# Patient Record
Sex: FEMALE | Race: ASIAN | Employment: PART TIME | URBAN - METROPOLITAN AREA
[De-identification: names, ages, dates, MRNs, and addresses within clinical notes are randomized per-mention and may not be internally consistent; named-entity substitution may affect disease eponyms.]

---

## 2017-09-19 ENCOUNTER — HOSPITAL ENCOUNTER (OUTPATIENT)
Dept: MRI IMAGING | Age: 42
Discharge: HOME | End: 2017-09-19
Attending: OPHTHALMOLOGY
Payer: COMMERCIAL

## 2017-09-19 VITALS — WEIGHT: 150 LBS

## 2017-09-19 DIAGNOSIS — H47.092 OTHER DISORDERS OF OPTIC NERVE, NOT ELSEWHERE CLASSIFIED, LEFT EYE: ICD-10-CM

## 2017-09-19 PROCEDURE — 74011250636 HC RX REV CODE- 250/636: Performed by: RADIOLOGY

## 2017-09-19 PROCEDURE — A9576 INJ PROHANCE MULTIPACK: HCPCS | Performed by: RADIOLOGY

## 2017-09-19 PROCEDURE — 70553 MRI BRAIN STEM W/O & W/DYE: CPT

## 2017-09-19 RX ADMIN — GADOTERIDOL 13 ML: 279.3 INJECTION, SOLUTION INTRAVENOUS at 13:00

## 2017-10-04 ENCOUNTER — HOSPITAL ENCOUNTER (OUTPATIENT)
Dept: PREADMISSION TESTING | Age: 42
Discharge: HOME | DRG: 025 | End: 2017-10-04
Payer: COMMERCIAL

## 2017-10-04 VITALS
RESPIRATION RATE: 20 BRPM | HEIGHT: 62 IN | BODY MASS INDEX: 29.63 KG/M2 | HEART RATE: 60 BPM | DIASTOLIC BLOOD PRESSURE: 66 MMHG | SYSTOLIC BLOOD PRESSURE: 102 MMHG | TEMPERATURE: 98.3 F | WEIGHT: 161 LBS

## 2017-10-04 LAB
ANION GAP SERPL CALC-SCNC: 10 MMOL/L (ref 5–15)
APTT PPP: 24 SEC (ref 22.1–32.5)
BASOPHILS # BLD: 0 K/UL (ref 0–0.1)
BASOPHILS NFR BLD: 0 % (ref 0–1)
BUN SERPL-MCNC: 11 MG/DL (ref 6–20)
BUN/CREAT SERPL: 14 (ref 12–20)
CALCIUM SERPL-MCNC: 8.5 MG/DL (ref 8.5–10.1)
CHLORIDE SERPL-SCNC: 104 MMOL/L (ref 97–108)
CO2 SERPL-SCNC: 26 MMOL/L (ref 21–32)
CREAT SERPL-MCNC: 0.76 MG/DL (ref 0.55–1.02)
EOSINOPHIL # BLD: 0 K/UL (ref 0–0.4)
EOSINOPHIL NFR BLD: 0 % (ref 0–7)
ERYTHROCYTE [DISTWIDTH] IN BLOOD BY AUTOMATED COUNT: 14.7 % (ref 11.5–14.5)
GLUCOSE SERPL-MCNC: 130 MG/DL (ref 65–100)
HCT VFR BLD AUTO: 39.7 % (ref 35–47)
HGB BLD-MCNC: 12.6 G/DL (ref 11.5–16)
INR PPP: 1 (ref 0.9–1.1)
LYMPHOCYTES # BLD: 1.4 K/UL (ref 0.8–3.5)
LYMPHOCYTES NFR BLD: 11 % (ref 12–49)
MCH RBC QN AUTO: 27.9 PG (ref 26–34)
MCHC RBC AUTO-ENTMCNC: 31.7 G/DL (ref 30–36.5)
MCV RBC AUTO: 87.8 FL (ref 80–99)
MONOCYTES # BLD: 0.4 K/UL (ref 0–1)
MONOCYTES NFR BLD: 3 % (ref 5–13)
NEUTS SEG # BLD: 11.1 K/UL (ref 1.8–8)
NEUTS SEG NFR BLD: 86 % (ref 32–75)
PLATELET # BLD AUTO: 216 K/UL (ref 150–400)
POTASSIUM SERPL-SCNC: 4.7 MMOL/L (ref 3.5–5.1)
PROTHROMBIN TIME: 9.9 SEC (ref 9–11.1)
RBC # BLD AUTO: 4.52 M/UL (ref 3.8–5.2)
SODIUM SERPL-SCNC: 140 MMOL/L (ref 136–145)
THERAPEUTIC RANGE,PTTT: NORMAL SECS (ref 58–77)
WBC # BLD AUTO: 12.9 K/UL (ref 3.6–11)

## 2017-10-04 RX ORDER — ACETAMINOPHEN 325 MG/1
650 TABLET ORAL
COMMUNITY

## 2017-10-04 RX ORDER — LEVOTHYROXINE SODIUM 100 UG/1
100 TABLET ORAL
COMMUNITY

## 2017-10-04 RX ORDER — ERGOCALCIFEROL 1.25 MG/1
50000 CAPSULE ORAL
COMMUNITY

## 2017-10-04 RX ORDER — BISMUTH SUBSALICYLATE 262 MG
1 TABLET,CHEWABLE ORAL DAILY
COMMUNITY

## 2017-10-04 RX ORDER — DEXAMETHASONE 4 MG/1
4 TABLET ORAL DAILY
Status: ON HOLD | COMMUNITY
End: 2017-10-13

## 2017-10-05 ENCOUNTER — HOSPITAL ENCOUNTER (OUTPATIENT)
Dept: CT IMAGING | Age: 42
Discharge: HOME | DRG: 025 | End: 2017-10-05
Attending: NEUROLOGICAL SURGERY
Payer: COMMERCIAL

## 2017-10-05 VITALS
RESPIRATION RATE: 20 BRPM | SYSTOLIC BLOOD PRESSURE: 118 MMHG | DIASTOLIC BLOOD PRESSURE: 79 MMHG | OXYGEN SATURATION: 100 % | HEART RATE: 71 BPM

## 2017-10-05 DIAGNOSIS — D32.9 BENIGN NEOPLASM OF MENINGES (HCC): ICD-10-CM

## 2017-10-05 RX ORDER — SODIUM CHLORIDE 0.9 % (FLUSH) 0.9 %
10 SYRINGE (ML) INJECTION
Status: COMPLETED | OUTPATIENT
Start: 2017-10-05 | End: 2017-10-05

## 2017-10-05 RX ADMIN — IOPAMIDOL 100 ML: 612 INJECTION, SOLUTION INTRAVENOUS at 10:51

## 2017-10-05 RX ADMIN — SODIUM CHLORIDE 100 ML: 900 INJECTION, SOLUTION INTRAVENOUS at 10:51

## 2017-10-05 RX ADMIN — Medication 10 ML: at 10:51

## 2017-10-05 NOTE — PROGRESS NOTES
Called to see pt in CT ER s/p CT of head with IV contrast.  Pt c/o itching in throat , has no wheezing or SOB.  VSS. See connectcare flowsheet. IV still in. Pt has one small raised area in right upper chest.      Notified Dr. Debby Yanez of above. Received no further orders except to monitor pt for 15 more minutes. Encouraged pt to drink water and monitored pt for 40 min post CT scan. Pt drank 1200 cc of water. Pt stable at discharge. VSS. See flowsheet. Right upper chest raised area dissipated. Itchiness in throat dissipated to slight feeling. Pt and  verbalized understanding of instructions. Pt discharged to home with belongings and instructions via wheelchair with .

## 2017-10-06 ENCOUNTER — ANESTHESIA EVENT (OUTPATIENT)
Dept: SURGERY | Age: 42
DRG: 025 | End: 2017-10-06
Payer: COMMERCIAL

## 2017-10-09 ENCOUNTER — ANESTHESIA (OUTPATIENT)
Dept: SURGERY | Age: 42
DRG: 025 | End: 2017-10-09
Payer: COMMERCIAL

## 2017-10-09 ENCOUNTER — HOSPITAL ENCOUNTER (INPATIENT)
Age: 42
LOS: 4 days | Discharge: HOME | DRG: 025 | End: 2017-10-13
Attending: NEUROLOGICAL SURGERY | Admitting: NEUROLOGICAL SURGERY
Payer: COMMERCIAL

## 2017-10-09 PROBLEM — D32.9 MENINGIOMA (HCC): Status: ACTIVE | Noted: 2017-10-09

## 2017-10-09 LAB
ARTERIAL PATENCY WRIST A: ABNORMAL
ARTERIAL PATENCY WRIST A: ABNORMAL
BASE DEFICIT BLD-SCNC: 1 MMOL/L
BASE EXCESS BLD CALC-SCNC: 0 MMOL/L
BDY SITE: ABNORMAL
BDY SITE: ABNORMAL
GAS FLOW.O2 O2 DELIVERY SYS: ABNORMAL L/MIN
GAS FLOW.O2 O2 DELIVERY SYS: ABNORMAL L/MIN
GAS FLOW.O2 SETTING OXYMISER: 10 BPM
GAS FLOW.O2 SETTING OXYMISER: 12 BPM
GLUCOSE BLD STRIP.AUTO-MCNC: 82 MG/DL (ref 65–100)
HCG UR QL: NEGATIVE
HCO3 BLD-SCNC: 24 MMOL/L (ref 22–26)
HCO3 BLD-SCNC: 24 MMOL/L (ref 22–26)
O2/TOTAL GAS SETTING VFR VENT: 0.5 %
O2/TOTAL GAS SETTING VFR VENT: 50 %
PCO2 BLD: 33.8 MMHG (ref 35–45)
PCO2 BLD: 38.4 MMHG (ref 35–45)
PEEP RESPIRATORY: 5 CMH2O
PEEP RESPIRATORY: 5 CMH2O
PH BLD: 7.4 [PH] (ref 7.35–7.45)
PH BLD: 7.46 [PH] (ref 7.35–7.45)
PO2 BLD: 209 MMHG (ref 80–100)
PO2 BLD: 222 MMHG (ref 80–100)
SAO2 % BLD: 100 % (ref 92–97)
SAO2 % BLD: 100 % (ref 92–97)
SERVICE CMNT-IMP: NORMAL
SPECIMEN TYPE: ABNORMAL
SPECIMEN TYPE: ABNORMAL
VENTILATION MODE VENT: ABNORMAL
VENTILATION MODE VENT: ABNORMAL
VOLUME CONTROL IVLC: YES
VT SETTING VENT: 470 ML
VT SETTING VENT: 525 ML

## 2017-10-09 PROCEDURE — 77030010813: Performed by: NEUROLOGICAL SURGERY

## 2017-10-09 PROCEDURE — 74011250636 HC RX REV CODE- 250/636: Performed by: NEUROLOGICAL SURGERY

## 2017-10-09 PROCEDURE — 74011000250 HC RX REV CODE- 250

## 2017-10-09 PROCEDURE — 74011000258 HC RX REV CODE- 258

## 2017-10-09 PROCEDURE — 77030010938 HC CLP LIG TELE -A: Performed by: NEUROLOGICAL SURGERY

## 2017-10-09 PROCEDURE — 77030018836 HC SOL IRR NACL ICUM -A: Performed by: NEUROLOGICAL SURGERY

## 2017-10-09 PROCEDURE — 77030013797 HC KT TRNSDUC PRSSR EDWD -A

## 2017-10-09 PROCEDURE — 77030003892 HC BIT DRL TWST MEDT -B: Performed by: NEUROLOGICAL SURGERY

## 2017-10-09 PROCEDURE — 74011250636 HC RX REV CODE- 250/636: Performed by: ANESTHESIOLOGY

## 2017-10-09 PROCEDURE — C1713 ANCHOR/SCREW BN/BN,TIS/BN: HCPCS | Performed by: NEUROLOGICAL SURGERY

## 2017-10-09 PROCEDURE — 77030032490 HC SLV COMPR SCD KNE COVD -B: Performed by: NEUROLOGICAL SURGERY

## 2017-10-09 PROCEDURE — 77030004391 HC BUR FLUT MEDT -C: Performed by: NEUROLOGICAL SURGERY

## 2017-10-09 PROCEDURE — 74011250636 HC RX REV CODE- 250/636

## 2017-10-09 PROCEDURE — 76060000045 HC ANESTHESIA 7 TO 7.5 HR: Performed by: NEUROLOGICAL SURGERY

## 2017-10-09 PROCEDURE — 82962 GLUCOSE BLOOD TEST: CPT

## 2017-10-09 PROCEDURE — 77030031139 HC SUT VCRL2 J&J -A: Performed by: NEUROLOGICAL SURGERY

## 2017-10-09 PROCEDURE — 88331 PATH CONSLTJ SURG 1 BLK 1SPC: CPT | Performed by: NEUROLOGICAL SURGERY

## 2017-10-09 PROCEDURE — 77030002946 HC SUT NRLN J&J -B: Performed by: NEUROLOGICAL SURGERY

## 2017-10-09 PROCEDURE — 77030020061 HC IV BLD WRMR ADMIN SET 3M -B: Performed by: ANESTHESIOLOGY

## 2017-10-09 PROCEDURE — 74011000250 HC RX REV CODE- 250: Performed by: NEUROLOGICAL SURGERY

## 2017-10-09 PROCEDURE — 77030013137 HC MRK XR CRAN BUSA -A: Performed by: NEUROLOGICAL SURGERY

## 2017-10-09 PROCEDURE — 77030014647 HC SEAL FBRN TISSL BAXT -D: Performed by: NEUROLOGICAL SURGERY

## 2017-10-09 PROCEDURE — 82803 BLOOD GASES ANY COMBINATION: CPT

## 2017-10-09 PROCEDURE — 77030005402 HC CATH RAD ART LN KT TELE -B

## 2017-10-09 PROCEDURE — 77030003029 HC SUT VCRL J&J -B: Performed by: NEUROLOGICAL SURGERY

## 2017-10-09 PROCEDURE — 76210000006 HC OR PH I REC 0.5 TO 1 HR: Performed by: NEUROLOGICAL SURGERY

## 2017-10-09 PROCEDURE — 77030036859 HC DRN CSF EXT VOL LIMIT SYS INLC -D: Performed by: NEUROLOGICAL SURGERY

## 2017-10-09 PROCEDURE — 77030020782 HC GWN BAIR PAWS FLX 3M -B

## 2017-10-09 PROCEDURE — 76010000181 HC OR TIME 7 TO 7.5 HR INTENSV-TIER 1: Performed by: NEUROLOGICAL SURGERY

## 2017-10-09 PROCEDURE — 77030014355 HC CVR BUR H TI BIOM -C: Performed by: NEUROLOGICAL SURGERY

## 2017-10-09 PROCEDURE — 77030004826 HC CATH CSF INLC -C: Performed by: NEUROLOGICAL SURGERY

## 2017-10-09 PROCEDURE — 77030011640 HC PAD GRND REM COVD -A: Performed by: NEUROLOGICAL SURGERY

## 2017-10-09 PROCEDURE — 77030014007 HC SPNG HEMSTAT J&J -B: Performed by: NEUROLOGICAL SURGERY

## 2017-10-09 PROCEDURE — 77030037673 HC TBNG VISTA V-LYTE PMP STRY -B: Performed by: NEUROLOGICAL SURGERY

## 2017-10-09 PROCEDURE — 85018 HEMOGLOBIN: CPT

## 2017-10-09 PROCEDURE — 77030008684 HC TU ET CUF COVD -B: Performed by: ANESTHESIOLOGY

## 2017-10-09 PROCEDURE — 77030020263 HC SOL INJ SOD CL0.9% LFCR 1000ML: Performed by: NEUROLOGICAL SURGERY

## 2017-10-09 PROCEDURE — 88307 TISSUE EXAM BY PATHOLOGIST: CPT | Performed by: NEUROLOGICAL SURGERY

## 2017-10-09 PROCEDURE — 81025 URINE PREGNANCY TEST: CPT

## 2017-10-09 PROCEDURE — 77030034349 HC TIP SPTZLR BARACUDA DISP STRY -G: Performed by: NEUROLOGICAL SURGERY

## 2017-10-09 PROCEDURE — 00B00ZZ EXCISION OF BRAIN, OPEN APPROACH: ICD-10-PCS | Performed by: NEUROLOGICAL SURGERY

## 2017-10-09 PROCEDURE — 65610000006 HC RM INTENSIVE CARE

## 2017-10-09 PROCEDURE — 74011000272 HC RX REV CODE- 272: Performed by: NEUROLOGICAL SURGERY

## 2017-10-09 PROCEDURE — 74011000258 HC RX REV CODE- 258: Performed by: NEUROLOGICAL SURGERY

## 2017-10-09 PROCEDURE — 77030013079 HC BLNKT BAIR HGGR 3M -A: Performed by: ANESTHESIOLOGY

## 2017-10-09 PROCEDURE — 77030004472 HC BUR TAPR MEDT -B: Performed by: NEUROLOGICAL SURGERY

## 2017-10-09 PROCEDURE — 77030009081 HC CLP NEUR GUN SET MEDT -B: Performed by: NEUROLOGICAL SURGERY

## 2017-10-09 PROCEDURE — 77030018390 HC SPNG HEMSTAT2 J&J -B: Performed by: NEUROLOGICAL SURGERY

## 2017-10-09 PROCEDURE — 77030012602 HC SPNG PTTY NEUR J&J -B: Performed by: NEUROLOGICAL SURGERY

## 2017-10-09 PROCEDURE — 77030026438 HC STYL ET INTUB CARD -A: Performed by: ANESTHESIOLOGY

## 2017-10-09 PROCEDURE — 77030002996 HC SUT SLK J&J -A: Performed by: NEUROLOGICAL SURGERY

## 2017-10-09 PROCEDURE — 77030012890

## 2017-10-09 DEVICE — PLATE BONE SZ 1.5MM REG 6 H SLV DBL Y SHP TRAUMAONE LORENZ: Type: IMPLANTABLE DEVICE | Site: SKULL | Status: FUNCTIONAL

## 2017-10-09 DEVICE — COVER BUR H L DIA18.5MM THK0.5MM 5 H NEURO TI W/O TAB: Type: IMPLANTABLE DEVICE | Site: SKULL | Status: FUNCTIONAL

## 2017-10-09 DEVICE — SCREW BNE L3.5MM DIA1.5MM CORT MAXILLOMANDIBULAR GRN TI: Type: IMPLANTABLE DEVICE | Site: SKULL | Status: FUNCTIONAL

## 2017-10-09 DEVICE — 15CC HYDROSET INJECTABLE CEMENT
Type: IMPLANTABLE DEVICE | Site: CRANIAL | Status: FUNCTIONAL
Brand: HYDROSET

## 2017-10-09 DEVICE — SCREW BONE CRANIO MAXILLOFACIAL HI TORQ CROSS DRV ST MIDFACE: Type: IMPLANTABLE DEVICE | Site: SKULL | Status: FUNCTIONAL

## 2017-10-09 RX ORDER — SODIUM CHLORIDE, SODIUM LACTATE, POTASSIUM CHLORIDE, CALCIUM CHLORIDE 600; 310; 30; 20 MG/100ML; MG/100ML; MG/100ML; MG/100ML
1000 INJECTION, SOLUTION INTRAVENOUS CONTINUOUS
Status: DISCONTINUED | OUTPATIENT
Start: 2017-10-09 | End: 2017-10-09 | Stop reason: HOSPADM

## 2017-10-09 RX ORDER — FENTANYL CITRATE 50 UG/ML
50 INJECTION, SOLUTION INTRAMUSCULAR; INTRAVENOUS AS NEEDED
Status: DISCONTINUED | OUTPATIENT
Start: 2017-10-09 | End: 2017-10-09 | Stop reason: HOSPADM

## 2017-10-09 RX ORDER — CEFAZOLIN SODIUM IN 0.9 % NACL 2 G/50 ML
2 INTRAVENOUS SOLUTION, PIGGYBACK (ML) INTRAVENOUS EVERY 8 HOURS
Status: COMPLETED | OUTPATIENT
Start: 2017-10-09 | End: 2017-10-10

## 2017-10-09 RX ORDER — MIDAZOLAM HYDROCHLORIDE 1 MG/ML
INJECTION, SOLUTION INTRAMUSCULAR; INTRAVENOUS AS NEEDED
Status: DISCONTINUED | OUTPATIENT
Start: 2017-10-09 | End: 2017-10-09 | Stop reason: HOSPADM

## 2017-10-09 RX ORDER — ALBUTEROL SULFATE 0.83 MG/ML
2.5 SOLUTION RESPIRATORY (INHALATION) AS NEEDED
Status: DISCONTINUED | OUTPATIENT
Start: 2017-10-09 | End: 2017-10-09 | Stop reason: HOSPADM

## 2017-10-09 RX ORDER — LABETALOL HYDROCHLORIDE 5 MG/ML
10 INJECTION, SOLUTION INTRAVENOUS AS NEEDED
Status: DISCONTINUED | OUTPATIENT
Start: 2017-10-09 | End: 2017-10-13 | Stop reason: HOSPADM

## 2017-10-09 RX ORDER — FENTANYL CITRATE 50 UG/ML
25 INJECTION, SOLUTION INTRAMUSCULAR; INTRAVENOUS
Status: DISCONTINUED | OUTPATIENT
Start: 2017-10-09 | End: 2017-10-09 | Stop reason: HOSPADM

## 2017-10-09 RX ORDER — ONDANSETRON 2 MG/ML
4 INJECTION INTRAMUSCULAR; INTRAVENOUS AS NEEDED
Status: DISCONTINUED | OUTPATIENT
Start: 2017-10-09 | End: 2017-10-09 | Stop reason: HOSPADM

## 2017-10-09 RX ORDER — MIDAZOLAM HYDROCHLORIDE 1 MG/ML
1 INJECTION, SOLUTION INTRAMUSCULAR; INTRAVENOUS AS NEEDED
Status: DISCONTINUED | OUTPATIENT
Start: 2017-10-09 | End: 2017-10-09 | Stop reason: HOSPADM

## 2017-10-09 RX ORDER — SODIUM CHLORIDE 9 MG/ML
25 INJECTION, SOLUTION INTRAVENOUS CONTINUOUS
Status: DISCONTINUED | OUTPATIENT
Start: 2017-10-09 | End: 2017-10-09 | Stop reason: HOSPADM

## 2017-10-09 RX ORDER — SUCCINYLCHOLINE CHLORIDE 20 MG/ML
INJECTION INTRAMUSCULAR; INTRAVENOUS AS NEEDED
Status: DISCONTINUED | OUTPATIENT
Start: 2017-10-09 | End: 2017-10-09 | Stop reason: HOSPADM

## 2017-10-09 RX ORDER — LEVETIRACETAM 10 MG/ML
INJECTION INTRAVASCULAR AS NEEDED
Status: DISCONTINUED | OUTPATIENT
Start: 2017-10-09 | End: 2017-10-09 | Stop reason: HOSPADM

## 2017-10-09 RX ORDER — HYDROMORPHONE HYDROCHLORIDE 1 MG/ML
INJECTION, SOLUTION INTRAMUSCULAR; INTRAVENOUS; SUBCUTANEOUS AS NEEDED
Status: DISCONTINUED | OUTPATIENT
Start: 2017-10-09 | End: 2017-10-09 | Stop reason: HOSPADM

## 2017-10-09 RX ORDER — PROPOFOL 10 MG/ML
INJECTION, EMULSION INTRAVENOUS
Status: DISCONTINUED | OUTPATIENT
Start: 2017-10-09 | End: 2017-10-09 | Stop reason: HOSPADM

## 2017-10-09 RX ORDER — ONDANSETRON 2 MG/ML
4 INJECTION INTRAMUSCULAR; INTRAVENOUS
Status: DISCONTINUED | OUTPATIENT
Start: 2017-10-09 | End: 2017-10-13 | Stop reason: HOSPADM

## 2017-10-09 RX ORDER — SODIUM CHLORIDE 0.9 % (FLUSH) 0.9 %
5-10 SYRINGE (ML) INJECTION EVERY 8 HOURS
Status: DISCONTINUED | OUTPATIENT
Start: 2017-10-09 | End: 2017-10-13 | Stop reason: HOSPADM

## 2017-10-09 RX ORDER — LEVOTHYROXINE SODIUM 100 UG/1
100 TABLET ORAL
Status: DISCONTINUED | OUTPATIENT
Start: 2017-10-10 | End: 2017-10-13 | Stop reason: HOSPADM

## 2017-10-09 RX ORDER — DEXAMETHASONE SODIUM PHOSPHATE 4 MG/ML
6 INJECTION, SOLUTION INTRA-ARTICULAR; INTRALESIONAL; INTRAMUSCULAR; INTRAVENOUS; SOFT TISSUE EVERY 6 HOURS
Status: DISCONTINUED | OUTPATIENT
Start: 2017-10-09 | End: 2017-10-10

## 2017-10-09 RX ORDER — OXYCODONE AND ACETAMINOPHEN 5; 325 MG/1; MG/1
1-2 TABLET ORAL
Status: DISCONTINUED | OUTPATIENT
Start: 2017-10-09 | End: 2017-10-13 | Stop reason: HOSPADM

## 2017-10-09 RX ORDER — MIDAZOLAM HYDROCHLORIDE 1 MG/ML
0.5 INJECTION, SOLUTION INTRAMUSCULAR; INTRAVENOUS
Status: DISCONTINUED | OUTPATIENT
Start: 2017-10-09 | End: 2017-10-09 | Stop reason: HOSPADM

## 2017-10-09 RX ORDER — ACETAMINOPHEN 325 MG/1
650 TABLET ORAL
Status: DISCONTINUED | OUTPATIENT
Start: 2017-10-09 | End: 2017-10-13 | Stop reason: HOSPADM

## 2017-10-09 RX ORDER — CEFAZOLIN SODIUM IN 0.9 % NACL 2 G/50 ML
2 INTRAVENOUS SOLUTION, PIGGYBACK (ML) INTRAVENOUS ONCE
Status: COMPLETED | OUTPATIENT
Start: 2017-10-09 | End: 2017-10-09

## 2017-10-09 RX ORDER — GLYCOPYRROLATE 0.2 MG/ML
INJECTION INTRAMUSCULAR; INTRAVENOUS AS NEEDED
Status: DISCONTINUED | OUTPATIENT
Start: 2017-10-09 | End: 2017-10-09 | Stop reason: HOSPADM

## 2017-10-09 RX ORDER — ROPIVACAINE HYDROCHLORIDE 5 MG/ML
30 INJECTION, SOLUTION EPIDURAL; INFILTRATION; PERINEURAL AS NEEDED
Status: DISCONTINUED | OUTPATIENT
Start: 2017-10-09 | End: 2017-10-09 | Stop reason: HOSPADM

## 2017-10-09 RX ORDER — SODIUM CHLORIDE AND POTASSIUM CHLORIDE .9; .15 G/100ML; G/100ML
SOLUTION INTRAVENOUS CONTINUOUS
Status: DISCONTINUED | OUTPATIENT
Start: 2017-10-09 | End: 2017-10-11

## 2017-10-09 RX ORDER — POVIDONE-IODINE 10 MG/G
OINTMENT TOPICAL AS NEEDED
Status: DISCONTINUED | OUTPATIENT
Start: 2017-10-09 | End: 2017-10-09 | Stop reason: HOSPADM

## 2017-10-09 RX ORDER — DIPHENHYDRAMINE HYDROCHLORIDE 50 MG/ML
12.5 INJECTION, SOLUTION INTRAMUSCULAR; INTRAVENOUS AS NEEDED
Status: DISCONTINUED | OUTPATIENT
Start: 2017-10-09 | End: 2017-10-09 | Stop reason: HOSPADM

## 2017-10-09 RX ORDER — HYDROMORPHONE HYDROCHLORIDE 1 MG/ML
1 INJECTION, SOLUTION INTRAMUSCULAR; INTRAVENOUS; SUBCUTANEOUS
Status: DISCONTINUED | OUTPATIENT
Start: 2017-10-09 | End: 2017-10-13 | Stop reason: HOSPADM

## 2017-10-09 RX ORDER — HYDROMORPHONE HYDROCHLORIDE 1 MG/ML
0.2 INJECTION, SOLUTION INTRAMUSCULAR; INTRAVENOUS; SUBCUTANEOUS
Status: DISCONTINUED | OUTPATIENT
Start: 2017-10-09 | End: 2017-10-09 | Stop reason: HOSPADM

## 2017-10-09 RX ORDER — NEOSTIGMINE METHYLSULFATE 1 MG/ML
INJECTION INTRAVENOUS AS NEEDED
Status: DISCONTINUED | OUTPATIENT
Start: 2017-10-09 | End: 2017-10-09 | Stop reason: HOSPADM

## 2017-10-09 RX ORDER — ROCURONIUM BROMIDE 10 MG/ML
INJECTION, SOLUTION INTRAVENOUS AS NEEDED
Status: DISCONTINUED | OUTPATIENT
Start: 2017-10-09 | End: 2017-10-09 | Stop reason: HOSPADM

## 2017-10-09 RX ORDER — SODIUM CHLORIDE 0.9 % (FLUSH) 0.9 %
5-10 SYRINGE (ML) INJECTION AS NEEDED
Status: DISCONTINUED | OUTPATIENT
Start: 2017-10-09 | End: 2017-10-13 | Stop reason: HOSPADM

## 2017-10-09 RX ORDER — FENTANYL CITRATE 50 UG/ML
INJECTION, SOLUTION INTRAMUSCULAR; INTRAVENOUS AS NEEDED
Status: DISCONTINUED | OUTPATIENT
Start: 2017-10-09 | End: 2017-10-09 | Stop reason: HOSPADM

## 2017-10-09 RX ORDER — LIDOCAINE HYDROCHLORIDE 20 MG/ML
INJECTION, SOLUTION EPIDURAL; INFILTRATION; INTRACAUDAL; PERINEURAL AS NEEDED
Status: DISCONTINUED | OUTPATIENT
Start: 2017-10-09 | End: 2017-10-09 | Stop reason: HOSPADM

## 2017-10-09 RX ORDER — GLYCOPYRROLATE 0.2 MG/ML
0.2 INJECTION INTRAMUSCULAR; INTRAVENOUS
Status: DISCONTINUED | OUTPATIENT
Start: 2017-10-09 | End: 2017-10-09 | Stop reason: HOSPADM

## 2017-10-09 RX ORDER — ONDANSETRON 2 MG/ML
INJECTION INTRAMUSCULAR; INTRAVENOUS AS NEEDED
Status: DISCONTINUED | OUTPATIENT
Start: 2017-10-09 | End: 2017-10-09 | Stop reason: HOSPADM

## 2017-10-09 RX ORDER — MANNITOL 20 G/100ML
INJECTION, SOLUTION INTRAVENOUS
Status: DISCONTINUED | OUTPATIENT
Start: 2017-10-09 | End: 2017-10-09 | Stop reason: HOSPADM

## 2017-10-09 RX ORDER — HYDROCODONE BITARTRATE AND ACETAMINOPHEN 5; 325 MG/1; MG/1
1 TABLET ORAL AS NEEDED
Status: DISCONTINUED | OUTPATIENT
Start: 2017-10-09 | End: 2017-10-09 | Stop reason: HOSPADM

## 2017-10-09 RX ORDER — DEXAMETHASONE SODIUM PHOSPHATE 4 MG/ML
INJECTION, SOLUTION INTRA-ARTICULAR; INTRALESIONAL; INTRAMUSCULAR; INTRAVENOUS; SOFT TISSUE AS NEEDED
Status: DISCONTINUED | OUTPATIENT
Start: 2017-10-09 | End: 2017-10-09 | Stop reason: HOSPADM

## 2017-10-09 RX ORDER — SODIUM CHLORIDE 9 MG/ML
INJECTION, SOLUTION INTRAVENOUS
Status: DISCONTINUED | OUTPATIENT
Start: 2017-10-09 | End: 2017-10-09 | Stop reason: HOSPADM

## 2017-10-09 RX ORDER — PROPOFOL 10 MG/ML
INJECTION, EMULSION INTRAVENOUS AS NEEDED
Status: DISCONTINUED | OUTPATIENT
Start: 2017-10-09 | End: 2017-10-09 | Stop reason: HOSPADM

## 2017-10-09 RX ORDER — SODIUM CHLORIDE 9 MG/ML
250 INJECTION, SOLUTION INTRAVENOUS AS NEEDED
Status: DISCONTINUED | OUTPATIENT
Start: 2017-10-09 | End: 2017-10-09

## 2017-10-09 RX ORDER — LIDOCAINE HYDROCHLORIDE 10 MG/ML
0.1 INJECTION, SOLUTION EPIDURAL; INFILTRATION; INTRACAUDAL; PERINEURAL AS NEEDED
Status: DISCONTINUED | OUTPATIENT
Start: 2017-10-09 | End: 2017-10-09 | Stop reason: HOSPADM

## 2017-10-09 RX ORDER — MORPHINE SULFATE 10 MG/ML
2 INJECTION, SOLUTION INTRAMUSCULAR; INTRAVENOUS
Status: DISCONTINUED | OUTPATIENT
Start: 2017-10-09 | End: 2017-10-09 | Stop reason: HOSPADM

## 2017-10-09 RX ADMIN — PROPOFOL 150 MG: 10 INJECTION, EMULSION INTRAVENOUS at 09:57

## 2017-10-09 RX ADMIN — ROCURONIUM BROMIDE 5 MG: 10 INJECTION, SOLUTION INTRAVENOUS at 09:57

## 2017-10-09 RX ADMIN — ROCURONIUM BROMIDE 10 MG: 10 INJECTION, SOLUTION INTRAVENOUS at 11:47

## 2017-10-09 RX ADMIN — LEVETIRACETAM 500 MG: 100 INJECTION, SOLUTION, CONCENTRATE INTRAVENOUS at 21:28

## 2017-10-09 RX ADMIN — SODIUM CHLORIDE, SODIUM LACTATE, POTASSIUM CHLORIDE, AND CALCIUM CHLORIDE 1000 ML: 600; 310; 30; 20 INJECTION, SOLUTION INTRAVENOUS at 09:01

## 2017-10-09 RX ADMIN — HYDROMORPHONE HYDROCHLORIDE 0.2 MG: 1 INJECTION, SOLUTION INTRAMUSCULAR; INTRAVENOUS; SUBCUTANEOUS at 16:30

## 2017-10-09 RX ADMIN — FENTANYL CITRATE 25 MCG: 50 INJECTION, SOLUTION INTRAMUSCULAR; INTRAVENOUS at 15:22

## 2017-10-09 RX ADMIN — ROCURONIUM BROMIDE 10 MG: 10 INJECTION, SOLUTION INTRAVENOUS at 14:38

## 2017-10-09 RX ADMIN — LIDOCAINE HYDROCHLORIDE 80 MG: 20 INJECTION, SOLUTION EPIDURAL; INFILTRATION; INTRACAUDAL; PERINEURAL at 09:57

## 2017-10-09 RX ADMIN — FENTANYL CITRATE 50 MCG: 50 INJECTION, SOLUTION INTRAMUSCULAR; INTRAVENOUS at 10:27

## 2017-10-09 RX ADMIN — ROCURONIUM BROMIDE 10 MG: 10 INJECTION, SOLUTION INTRAVENOUS at 13:15

## 2017-10-09 RX ADMIN — ROCURONIUM BROMIDE 10 MG: 10 INJECTION, SOLUTION INTRAVENOUS at 11:29

## 2017-10-09 RX ADMIN — POTASSIUM CHLORIDE AND SODIUM CHLORIDE: 900; 150 INJECTION, SOLUTION INTRAVENOUS at 18:09

## 2017-10-09 RX ADMIN — CEFAZOLIN 2 G: 1 INJECTION, POWDER, FOR SOLUTION INTRAMUSCULAR; INTRAVENOUS; PARENTERAL at 14:11

## 2017-10-09 RX ADMIN — SODIUM CHLORIDE, SODIUM LACTATE, POTASSIUM CHLORIDE, AND CALCIUM CHLORIDE 1000 ML: 600; 310; 30; 20 INJECTION, SOLUTION INTRAVENOUS at 08:34

## 2017-10-09 RX ADMIN — HYDROMORPHONE HYDROCHLORIDE 0.2 MG: 1 INJECTION, SOLUTION INTRAMUSCULAR; INTRAVENOUS; SUBCUTANEOUS at 16:25

## 2017-10-09 RX ADMIN — ROCURONIUM BROMIDE 10 MG: 10 INJECTION, SOLUTION INTRAVENOUS at 15:16

## 2017-10-09 RX ADMIN — ROCURONIUM BROMIDE 10 MG: 10 INJECTION, SOLUTION INTRAVENOUS at 10:44

## 2017-10-09 RX ADMIN — CEFAZOLIN 2 G: 1 INJECTION, POWDER, FOR SOLUTION INTRAMUSCULAR; INTRAVENOUS; PARENTERAL at 10:35

## 2017-10-09 RX ADMIN — ROCURONIUM BROMIDE 10 MG: 10 INJECTION, SOLUTION INTRAVENOUS at 15:53

## 2017-10-09 RX ADMIN — Medication 10 ML: at 21:30

## 2017-10-09 RX ADMIN — ROCURONIUM BROMIDE 10 MG: 10 INJECTION, SOLUTION INTRAVENOUS at 12:49

## 2017-10-09 RX ADMIN — PROPOFOL 75 MCG/KG/MIN: 10 INJECTION, EMULSION INTRAVENOUS at 10:01

## 2017-10-09 RX ADMIN — MANNITOL: 20 INJECTION, SOLUTION INTRAVENOUS at 10:04

## 2017-10-09 RX ADMIN — FENTANYL CITRATE 50 MCG: 50 INJECTION, SOLUTION INTRAMUSCULAR; INTRAVENOUS at 10:11

## 2017-10-09 RX ADMIN — FENTANYL CITRATE 50 MCG: 50 INJECTION, SOLUTION INTRAMUSCULAR; INTRAVENOUS at 09:57

## 2017-10-09 RX ADMIN — MIDAZOLAM HYDROCHLORIDE 2 MG: 1 INJECTION, SOLUTION INTRAMUSCULAR; INTRAVENOUS at 09:49

## 2017-10-09 RX ADMIN — DEXAMETHASONE SODIUM PHOSPHATE 10 MG: 4 INJECTION, SOLUTION INTRA-ARTICULAR; INTRALESIONAL; INTRAMUSCULAR; INTRAVENOUS; SOFT TISSUE at 10:04

## 2017-10-09 RX ADMIN — LEVETIRACETAM 1000 MG: 10 INJECTION INTRAVASCULAR at 10:05

## 2017-10-09 RX ADMIN — FENTANYL CITRATE 50 MCG: 50 INJECTION, SOLUTION INTRAMUSCULAR; INTRAVENOUS at 11:16

## 2017-10-09 RX ADMIN — DEXAMETHASONE SODIUM PHOSPHATE 10 MG: 4 INJECTION, SOLUTION INTRA-ARTICULAR; INTRALESIONAL; INTRAMUSCULAR; INTRAVENOUS; SOFT TISSUE at 15:28

## 2017-10-09 RX ADMIN — ROCURONIUM BROMIDE 10 MG: 10 INJECTION, SOLUTION INTRAVENOUS at 12:30

## 2017-10-09 RX ADMIN — FENTANYL CITRATE 50 MCG: 50 INJECTION, SOLUTION INTRAMUSCULAR; INTRAVENOUS at 10:54

## 2017-10-09 RX ADMIN — GLYCOPYRROLATE 0.4 MG: 0.2 INJECTION INTRAMUSCULAR; INTRAVENOUS at 16:55

## 2017-10-09 RX ADMIN — ROCURONIUM BROMIDE 10 MG: 10 INJECTION, SOLUTION INTRAVENOUS at 13:58

## 2017-10-09 RX ADMIN — DEXAMETHASONE SODIUM PHOSPHATE 6 MG: 4 INJECTION, SOLUTION INTRAMUSCULAR; INTRAVENOUS at 23:52

## 2017-10-09 RX ADMIN — FENTANYL CITRATE 25 MCG: 50 INJECTION, SOLUTION INTRAMUSCULAR; INTRAVENOUS at 13:32

## 2017-10-09 RX ADMIN — HYDROMORPHONE HYDROCHLORIDE 1 MG: 1 INJECTION, SOLUTION INTRAMUSCULAR; INTRAVENOUS; SUBCUTANEOUS at 22:46

## 2017-10-09 RX ADMIN — SODIUM CHLORIDE: 9 INJECTION, SOLUTION INTRAVENOUS at 10:30

## 2017-10-09 RX ADMIN — DEXAMETHASONE SODIUM PHOSPHATE 6 MG: 4 INJECTION, SOLUTION INTRAMUSCULAR; INTRAVENOUS at 20:07

## 2017-10-09 RX ADMIN — MIDAZOLAM HYDROCHLORIDE 2 MG: 1 INJECTION, SOLUTION INTRAMUSCULAR; INTRAVENOUS at 09:15

## 2017-10-09 RX ADMIN — HYDROMORPHONE HYDROCHLORIDE 0.2 MG: 1 INJECTION, SOLUTION INTRAMUSCULAR; INTRAVENOUS; SUBCUTANEOUS at 16:40

## 2017-10-09 RX ADMIN — SUCCINYLCHOLINE CHLORIDE 120 MG: 20 INJECTION INTRAMUSCULAR; INTRAVENOUS at 09:57

## 2017-10-09 RX ADMIN — NEOSTIGMINE METHYLSULFATE 2 MG: 1 INJECTION INTRAVENOUS at 16:55

## 2017-10-09 RX ADMIN — ONDANSETRON 4 MG: 2 INJECTION INTRAMUSCULAR; INTRAVENOUS at 16:52

## 2017-10-09 RX ADMIN — ROCURONIUM BROMIDE 45 MG: 10 INJECTION, SOLUTION INTRAVENOUS at 10:03

## 2017-10-09 RX ADMIN — CEFAZOLIN 2 G: 1 INJECTION, POWDER, FOR SOLUTION INTRAMUSCULAR; INTRAVENOUS; PARENTERAL at 20:51

## 2017-10-09 RX ADMIN — PROPOFOL 50 MG: 10 INJECTION, EMULSION INTRAVENOUS at 10:15

## 2017-10-09 NOTE — BRIEF OP NOTE
BRIEF OPERATIVE NOTE    Date of Procedure: 10/9/2017   Preoperative Diagnosis: MENINGIOMA  Postoperative Diagnosis: MENINGIOMA    Procedure(s):  BRAIN LAB GUIDED BIFRONTAL CRANIOTOMY WITH RESECTION OF ANTERIOR SKULL BASE MENINGIOMA CRANIoplasty  S/ LUMBAR DRAIN   Surgeon(s) and Role:     * Leah Daniels MD - Primary     * Aimee Burrell MD - Assisting         Assistant Staff:       Surgical Staff:  Circ-1: Teresita Moe RN; Leopoldo Burn, RN  Circ-Relief: Gary Rao RN  Scrub RN-1: Gary Rao RN  Scrub RN-Relief: Teresita Moe RN; Gary Rao RN; Loretta Lan RN  Surg Asst-1: Ida Hasuerman  Event Time In   Incision Start 1104   Incision Close 1636     Anesthesia: General   Estimated Blood Loss: 250  Specimens:   ID Type Source Tests Collected by Time Destination   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  Item Serial No.  Lot No. LRB No. Used Action   CEMENT BNE INJECTIBLE 15ML HA --  - SN/A  CEMENT BNE INJECTIBLE 15ML HA --  N/A MURRAY CRANIOMAXILLOFACIAL [id number] N/A 1 Implanted   SCR 1.5 X 3.5 --  - SN/A  SCR 1.5 X 3.5 --  N/A [employer] N/A N/A 10 Implanted   SCR BNE ST HI TORQ 1.5X3.5 --  - SN/A  SCR BNE ST HI TORQ 1.5X3.5 --  N/A [employer] N/A N/A 10 Implanted   COVER BUR H LG 0.5X18.5MM TI --  - SN/A  COVER BUR H LG 0.5X18.5MM TI --  N/A [employer] N/A N/A 4 Implanted   PLATE BNE DBL Y REG 3.9IU TI --  - SN/A   PLATE BNE DBL Y REG 6.5RK TI --  N/A [employer] N/A N/A 2 Implanted

## 2017-10-09 NOTE — IP AVS SNAPSHOT
[facility] [facility] [address] [place] [address] 
[phone] Patient: Toni Aquino MRN: [id number] TDP:[id number]/8030 You are allergic to the following Allergen Reactions Latex Itching Contrast Dye (Iodine) Itching Recent Documentation Height Weight BMI OB Status Smoking Status 1.575 m 72.7 kg 29.31 kg/m2 Having regular periods Never Smoker Emergency Contacts Name Discharge Info Relation Home Work Mobile Samson Jhardip DISCHARGE CAREGIVER [3] Spouse [3] [phone] About your hospitalization You were admitted on:  October 9, 2017 You last received care in the:  [facility] 6S NEURO-SCI TELE You were discharged on:  October 13, 2017 Unit phone number:  [phone] Why you were hospitalized Your primary diagnosis was:  Not on File Your diagnoses also included:  Meningioma (Hcc) Providers Seen During Your Hospitalizations Provider Role Specialty Primary office phone Basil Cao MD Attending Provider Neurosurgery [phone] Your Primary Care Physician (PCP) Primary Care Physician Office Phone Office Fax Ranjan Gentry [phone] Follow-up Information Follow up With Details Comments Contact Info Carly Olivas MD In 1 week hospital follow-up 2010 [facility] [address] [place] [address] 
[phone] Basil Cao MD Schedule an appointment as soon as possible for a visit in 2 weeks For wound re-check [place] [address] [facility] [address] [phone] Current Discharge Medication List  
  
START taking these medications Dose & Instructions Dispensing Information Comments Morning Noon Evening Bedtime  
 docusate sodium 100 mg capsule Commonly known as:  Melvenia Clipper Your last dose was: Your next dose is: Take 1 capsule PO bid while on pain medication Quantity:  14 Cap Refills:  0  
     
   
   
   
  
 levETIRAcetam 500 mg tablet Commonly known as:  KEPPRA Your last dose was: Your next dose is:    
   
   
 Dose:  500 mg Take 1 Tab by mouth every twelve (12) hours. Quantity:  60 Tab Refills:  0  
     
   
   
   
  
 oxyCODONE-acetaminophen 5-325 mg per tablet Commonly known as:  PERCOCET Your last dose was: Your next dose is:    
   
   
 Dose:  1-2 Tab Take 1-2 Tabs by mouth every four (4) hours as needed. Max Daily Amount: 12 Tabs. Quantity:  22 Tab Refills:  0 CONTINUE these medications which have CHANGED Dose & Instructions Dispensing Information Comments Morning Noon Evening Bedtime  
 dexamethasone 2 mg tablet Commonly known as:  DECADRON What changed:   
- medication strength 
- how much to take 
- how to take this - when to take this 
- additional instructions Your last dose was: Your next dose is: Take 1 tab PO every 8 hours x 3 days then 1 tab PO every 12 hours x 3 days then 1 tab PO daily x 3 days then stop. Quantity:  18 Tab Refills:  0 CONTINUE these medications which have NOT CHANGED Dose & Instructions Dispensing Information Comments Morning Noon Evening Bedtime  
 levothyroxine 100 mcg tablet Commonly known as:  SYNTHROID Your last dose was: Your next dose is:    
   
   
 Dose:  100 mcg Take 100 mcg by mouth Daily (before breakfast). Refills:  0  
     
   
   
   
  
 multivitamin tablet Commonly known as:  ONE A DAY Your last dose was: Your next dose is:    
   
   
 Dose:  1 Tab Take 1 Tab by mouth daily. Refills:  0  
     
   
   
   
  
 TYLENOL 325 mg tablet Generic drug:  acetaminophen Your last dose was:     
   
Your next dose is:    
   
   
 Dose:  650 mg  
 Take 650 mg by mouth every four (4) hours as needed for Pain. Refills:  0  
     
   
   
   
  
 VITAMIN D2 50,000 unit capsule Generic drug:  ergocalciferol Your last dose was: Your next dose is:    
   
   
 Dose:  28654 Units Take 50,000 Units by mouth every seven (7) days. PATIENT TAKES MED ON SATURDAYS. Refills:  0 Where to Get Your Medications Information on where to get these meds will be given to you by the nurse or doctor. ! Ask your nurse or doctor about these medications  
  dexamethasone 2 mg tablet  
 docusate sodium 100 mg capsule  
 levETIRAcetam 500 mg tablet  
 oxyCODONE-acetaminophen 5-325 mg per tablet Discharge Instructions After Hospital Care Plan:  Discharge Instructions [facility] Patient Name: Antoine Govea Date of procedure: 10/9/2017  Date of discharge: 10/13/2017 Procedure: Procedure(s): BRAIN LAB GUIDED BIFRONTAL CRANIOTOMY WITH RESECTION OF ANTERIOR SKULL BASE MENINGIOMA , LUMBAR DRAIN PLACEMENT  PCP: Garrett Peterson MD 
 
Follow up appointments Follow up with your neurosurgeon in 10-14 days. Call [phone] to make an appointment as soon as you get home from the hospital.  Follow-up care is a key part of your treatment and safety. Be sure to make and go to all appointments, and call your doctor if you are having problems. It's also a good idea to know your test results and keep a list of the medicines you take. A craniotomy is surgery to open your skull to fix a problem in your brain. It can be done for many reasons. For example, you may need a craniotomy if your brain or blood vessels are damaged or if you have a tumor or an infection in your brain. You will probably feel very tired for several weeks after surgery. You may also have headaches or problems concentrating. It can take 4 to 8 weeks to recover from surgery. Your cuts (incisions) may be sore for about 5 days after surgery. You may also have numbness and shooting pains near your wound, or swelling and bruising around your eyes. As your wound starts to heal, it may begin to itch. Medicines and ice packs can help with headaches, pain, swelling, and itching. The stitches that hold your incisions together may go away on their own or will be removed in 7 to 10 days. This depends on the type of stitches the doctor uses. Staples are usually removed in 10-14 days. It is common for your scalp to swell with fluid. After the swelling goes down, you may have a dent in your head. Some kinds of plates stay attached to hold the skull flap to your head. If your head was shaved, you may wear clean hats or scarves on your head until your hair grows back. This care sheet gives you a general idea about how long it will take for you to recover. But each person recovers at a different pace. Follow the steps below to get better as quickly as possible. When to call your Neurosurgeon:  You have trouble thinking clearly.  You are sleeping more than you are awake.  You have a fever with a stiff neck or a severe headache.  You have any sudden vision changes.  Nausea or vomiting, severe headache.  Loss of bowel or bladder function, inability to urinate.  Increased weakness-greater than before your surgery.  Severe pain or pain not relieved by medications.  Signs of a blood clot in your leg-calf pain, tenderness, redness, swelling of lower leg.  Signs of infection-if your incision is red; continues to have drainage; drainage has a foul odor or if you have a persistent fever over 101 degrees for 24 hours.  You fall and hit your head. When to call your Primary Care Physician:  Concerns about medical conditions such as diabetes, high blood pressure, asthma, congestive heart failure.  
 Call if blood sugars are elevated, persistent headache or dizziness, coughing or congestion, constipation or diarrhea, burning with urination, abnormal heart rate. When to call 911 and go to the nearest emergency room:  Acute onset of chest pain, shortness of breath, difficulty breathing  You passed out (lost consciousness).  It is hard to think, move, speak, or see.  Your body is jerking or shaking. Activity ? Rest when you feel tired. It is normal to want to sleep during the day. It is a good idea to plan to take a nap every day. Getting enough sleep will help you recover. ? Try not to lie flat when you rest or sleep. You can use a wedge pillow, or you can put a rolled towel or foam padding under your pillow. You can also raise the head of your bed by putting bricks or wooden blocks under the bed legs. ? After lying down, bring your head up slowly. This can prevent headaches or dizziness. ? Try to walk each day. Start by walking a little more than you did the day before. Bit by bit, increase the amount you walk. Walking boosts blood flow and helps prevent pneumonia and constipation. ? Avoid heavy lifting until your doctor says it is okay. ? Do not drive for 2 to 3 weeks or until your doctor says it is okay. When you begin driving again, start with short, familiar routes in the daytime. ? Ask your doctor if it is safe for you to travel by plane. ? Avoid risky activities, such as climbing a ladder, for 3 months after surgery. ? Avoid strenuous activities, such as bicycle riding, jogging, weight lifting, or aerobic exercise, for 3 months or until your doctor says it is okay. ? Do not play any rough or contact sports for 3 months or until your doctor says it is okay. ? You may engage in sexual activity. Diet ? Resume usual diet; drink plenty of fluids; eat foods high in fiber ? It is important to have regular bowel movements. Pain medications may cause constipation.   You may want to take a stool softener (such as Senokot-S or Colace) to prevent constipation. ? If constipation occurs, take a laxative (such as Dulcolax tablets, Milk of Magnesia, or a suppository). Laxatives should only be used if the above preventable measures have failed and you still have not had a bowel movement after three days ? Try to avoid constipation and straining with bowel movements. Incision Care     You can wash your hair 7 days after your surgery. But do not soak your head or swim for 2 to 3 weeks. The sutures will dissolve on there own. This usually takes about 2 weeks.  Do not dye or color your hair for 4 weeks after your surgery.  Do not rub or apply any lotions or ointments to your incision site.  Do not scrub your wound Medicines  If your doctor or nurse practitioner prescribed antibiotics, take them as directed. Do not stop taking them just because you feel better. You need to take the full course of antibiotics.  If you get medicines to prevent seizures, take them exactly as directed.  If the doctor or nurse practitioner gave you a prescription medicine for pain, take it as prescribed.  If you are not taking a prescription pain medicine, ask your doctor or nurse practitioner if you can take an over-the-counter medicine.   
Pain Medication Safety DO: 
 Read the Medication Guide  Take your medicine exactly as prescribed  Store your medicine away from children and in a safe place  Call your healthcare provider for medical advice about side effects. You may report side effects to FDA at 0-408-FDA-1892.  Please be aware that many medications contain Tylenol. We do not want you to over medicate so please read the information below as a guide. Do not take more than 4 Grams of Tylenol in a 24 hour period if you are under age 79 or 3 Grams in 24 hours if you are over 79years old. (There are 1000 milligrams in one Gram) o Percocet contains 325 mg of Tylenol per tablet (do not take more than 12 tablets in 24 hours) 
o Lortab contains 500 mg of Tylenol per tablet (do not take more than 8 tablets in 24 hours) o Norco contains 325 mg of Tylenol per tablet (do not take more than 12 tablets in 24 hours). DO NOT: 
 Do not give your medicine to others.  Do not take medicine unless it was prescribed for you.  Do not stop taking your medicine without talking to your healthcare provider.  Do not break, chew, crush, dissolve, or inject your medicine. If you cannot swallow your medicine whole, talk to your healthcare provider.  Do not drink alcohol while taking this medicine.  Do not take anti-inflammatory medications or aspirin unless instructed by your physician. You have been given prescriptions for the following medications: 1. Dexamethasone 2 mg taper - this is a steroid. It helps with swelling in the brain. Take exactly as prescribed until finished. 2. Keppra 500 mg twice a day - this is an anti-seizure medication. It is taken to help prevent seizures from occurring. Continue to take this medication until Dr. Cintia Lma says to stop it. Please contact his office for refills if needed. 3. Docusate 100 mg twice a day - this medication is a stool softener. Take this medication while on pain medication as those medications are constipating. 4. Percocet 5/325 mg every 4 hours as needed - this is a pain medication. Take it only as needed. Do not drive while taking this medication Discharge Orders None [facility] Announcement We are excited to announce that we are making your provider's discharge notes available to you in zeenworld. You will see these notes when they are completed and signed by the physician that discharged you from your recent hospital stay.   If you have any questions or concerns about any information you see in zeenworld, please call the Health Information Department where you were seen or reach out to your Primary Care Provider for more information about your plan of care. Introducing [facility]! Andrew Lancaster introduces Spreecast patient portal. Now you can access parts of your medical record, email your doctor's office, and request medication refills online. 1. In your internet browser, go to [email or web address] 2. Click on the First Time User? Click Here link in the Sign In box. You will see the New Member Sign Up page. 3. Enter your Spreecast Access Code exactly as it appears below. You will not need to use this code after youve completed the sign-up process. If you do not sign up before the expiration date, you must request a new code. · Spreecast Access Code: [email or web address] Expires: 12/18/2017 11:19 AM 
 
4. Enter the last four digits of your Social Security Number (xxxx) and Date of Birth (mm/dd/yyyy) as indicated and click Submit. You will be taken to the next sign-up page. 5. Create a Spreecast ID. This will be your Spreecast login ID and cannot be changed, so think of one that is secure and easy to remember. 6. Create a Spreecast password. You can change your password at any time. 7. Enter your Password Reset Question and Answer. This can be used at a later time if you forget your password. 8. Enter your e-mail address. You will receive e-mail notification when new information is available in [address] 9. Click Sign Up. You can now view and download portions of your medical record. 10. Click the Download Summary menu link to download a portable copy of your medical information. If you have questions, please visit the Frequently Asked Questions section of the Spreecast website. Remember, Spreecast is NOT to be used for urgent needs. For medical emergencies, dial 911. Now available from your iPhone and Android! General Information Please provide this summary of care documentation to your next provider.  
  
  
    
    
 Patient Signature: ____________________________________________________________ Date:  ____________________________________________________________  
  
Orbie Manriquez Provider Signature:  ____________________________________________________________ Date:  ____________________________________________________________

## 2017-10-09 NOTE — ANESTHESIA PREPROCEDURE EVALUATION
Anesthetic History   No history of anesthetic complications            Review of Systems / Medical History  Patient summary reviewed, nursing notes reviewed and pertinent labs reviewed    Pulmonary          Smoker         Neuro/Psych             Comments: meningioma Cardiovascular  Within defined limits                Exercise tolerance: >4 METS     GI/Hepatic/Renal  Within defined limits              Endo/Other      Hypothyroidism       Other Findings            Physical Exam    Airway  Mallampati: II  TM Distance: > 6 cm  Neck ROM: normal range of motion   Mouth opening: Normal     Cardiovascular  Regular rate and rhythm,  S1 and S2 normal,  no murmur, click, rub, or gallop             Dental  No notable dental hx       Pulmonary  Breath sounds clear to auscultation               Abdominal  GI exam deferred       Other Findings            Anesthetic Plan    ASA: 2  Anesthesia type: general    Monitoring Plan: Arterial line      Induction: Intravenous  Anesthetic plan and risks discussed with: Patient

## 2017-10-09 NOTE — PERIOP NOTES
Dr Alex Mcdonald at PACU bedside to assess pt. MD aware of pt current neurologic status. MD states continue to monitor pt.

## 2017-10-09 NOTE — ANESTHESIA PROCEDURE NOTES
Arterial Line Placement    Start time: 10/9/2017 9:17 AM  End time: 10/9/2017 9:22 AM  Performed by: Bentley Gastelum  Authorized by: Bentley Gastelum     Pre-Procedure  Indications:  Arterial pressure monitoring and blood sampling  Preanesthetic Checklist: patient identified, risks and benefits discussed, patient being monitored, timeout performed and patient being monitored      Procedure:   Prep:  ChloraPrep  Orientation:  Right  Location:  Radial artery  Catheter size:  20 G  Number of attempts:  1  Cont Cardiac Output Sensor: No      Assessment:   Post-procedure:  Line secured and sterile dressing applied  Patient Tolerance:  Patient tolerated the procedure well with no immediate complications

## 2017-10-09 NOTE — PERIOP NOTES
36  Spoke to patient's  to update him about the patient's surgical status. 1330  Attempted to call patient's  to update him about the patient's surgical status. 5  Spoke to patient's  to update him about the patient's surgical status.

## 2017-10-09 NOTE — ANESTHESIA POSTPROCEDURE EVALUATION
Post-Anesthesia Evaluation and Assessment    Patient: Júnior Eller MRN: [id number]  SSN: [id number]    YOB: 1975  Age: 38 y.o. Sex: female       Cardiovascular Function/Vital Signs  Visit Vitals    /62    Pulse 72    Temp 37.3 °C (99.1 °F)    Resp 13    Ht 5' 2\" (1.575 m)    Wt 73 kg (161 lb)    SpO2 100%    BMI 29.45 kg/m2       Patient is status post general anesthesia for Procedure(s):  BRAIN LAB GUIDED BIFRONTAL CRANIOTOMY WITH RESECTION OF ANTERIOR SKULL BASE MENINGIOMA , LUMBAR DRAIN PLACEMENT. Nausea/Vomiting: None    Postoperative hydration reviewed and adequate. Pain:  Pain Scale 1: Adult Nonverbal Pain Scale (10/09/17 1815)  Pain Intensity 1: 0 (10/09/17 1815)   Managed    Neurological Status:   Neuro (WDL): Exceptions to WDL (10/09/17 1815)  Neuro  Neurologic State: Drowsy; Eyes open to stimulus (10/09/17 1815)  Orientation Level: Unable to verbalize (10/09/17 1815)  Cognition: Decreased command following (10/09/17 1815)  Assessment L Pupil: Brisk;Round (10/09/17 1815)  Size L Pupil (mm): 3 (10/09/17 1815)  Assessment R Pupil: Brisk;Round (10/09/17 1815)  Size R Pupil (mm): 3 (10/09/17 1815)  LUE Motor Response: Purposeful;Weak (10/09/17 1815)  RUE Motor Response: Purposeful;Weak (10/09/17 1815)   At baseline    Mental Status and Level of Consciousness: Arousable    Pulmonary Status:   O2 Device: Nasal cannula (10/09/17 1815)   Adequate oxygenation and airway patent    Complications related to anesthesia: None    Post-anesthesia assessment completed.  No concerns    Signed By: Alexa Frias MD     October 9, 2017

## 2017-10-09 NOTE — PROGRESS NOTES
1850 Patient received from PACU. Eyes do not open to any stimuli. Pupils equal, round, brisk. Weakly following commands x4. Dr Hillman Hidfiliberto updated on patient's status. 2L NC removed, O2 sat 98% on RA. NSR HR 80's. Crani dressing c/d/i.    200  at bedside, update provided, will be spending the night. Dr Kady Lim given update on patient's lethargy via telephone. Julissa Day called to unit, updated on patient's lethargy, unable to assess patient's orientation d/t nonverbal but weakly follows commands.

## 2017-10-09 NOTE — ROUTINE PROCESS
1850 TRANSFER - IN REPORT:    Verbal report received from ST. SAMMI PARSONS RN(name) on [facility] 56  being received from [facility]) for routine post - op      Report consisted of patients Situation, Background, Assessment and   Recommendations(SBAR). Information from the following report(s) SBAR, Kardex, Procedure Summary, Intake/Output, MAR, Accordion and Recent Results was reviewed with the receiving nurse. Opportunity for questions and clarification was provided. Assessment completed upon patients arrival to unit and care assumed. Primary Nurse Emanuel Marcelo RN and Natalee Madera RN performed a dual skin assessment on this patient No impairment noted      Bedside and Verbal shift change report given to Radha (oncoming nurse) by Margaret Irby RN (offgoing nurse). Report included the following information SBAR, Kardex, ED Summary, Procedure Summary, Intake/Output, MAR, Accordion and Recent Results.

## 2017-10-09 NOTE — ROUTINE PROCESS
TRANSFER - OUT REPORT:    Verbal report [facility] Marilyn RN(name[facility] for routine post - op       Report consisted of patients Situation, Background, Assessment and   Recommendations(SBAR). Time Pre op antibiotic given:1411  Anesthesia Stop time: 7849  Varela Present on Transfer to floor:yes  Order for Varela on Chart:yes    Information from the following report(s) SBAR, OR Summary, Procedure Summary, Intake/Output, MAR, Recent Results, Med Rec Status and Cardiac Rhythm NSR was reviewed with the receiving nurse. Opportunity for questions and clarification was provided. Is the patient on 02? YES       L/Min 2       Other     Is the patient on a monitor? YES    Is the nurse transporting with the patient? YES    Surgical Waiting Area notified of patient's transfer from PACU? YES      The following personal items collected during your admission accompanied patient upon transfer:   Dental Appliance:    Vision:    Hearing Aid:    Jewelry:    Clothing: Clothing:  At bedside (pt belongings bag)  Other Valuables:    Valuables sent to safe:

## 2017-10-09 NOTE — PERIOP NOTES
Patient: Liz Padilla MRN: [id number]  SSN: [id number]   YOB: 1975  Age: 38 y.o. Sex: female     Patient is status post Procedure(s):  BRAIN LAB GUIDED BIFRONTAL CRANIOTOMY WITH RESECTION OF ANTERIOR SKULL BASE MENINGIOMA CRANIALIZATION OF FRONTAL SINUS/ LUMBAR DRAIN . Surgeon(s) and Role:     * Alhaji Christy MD - Primary     * Lucas Madden MD - Assisting    Local/Dose/Irrigation:  19mL 1% Lidocaine with epinephrine/ 150,000 units Bacitracin in 3,000 mL 0.9% NaCl for prn irrigation to surgical site             Peripheral IV 10/09/17 Right Hand (Active)       Peripheral IV 10/09/17 Left Wrist (Active)      Arterial Line 10/09/17 Right Radial artery (Active)        Hemovac Head (Active)   Site Assessment Clean, dry, & intact 10/9/2017  4:17 PM   Dressing Status Clean, dry, & intact 10/9/2017  4:17 PM   Drainage Description Serosanguinous 10/9/2017  4:17 PM   Status Patent; Charged;Draining 10/9/2017  4:17 PM       CSF Drain 10/09/17 Mid Back (Active)   Site Assessment Clean, dry, & intact 10/9/2017  4:34 PM   Dressing Status Clean, dry, & intact 10/9/2017  4:34 PM   CSF Color Clear 10/9/2017  4:34 PM      Airway - Endotracheal Tube 10/09/17 Oral (Active)             Dressing/Packing:  Wound Head-DRESSING TYPE: 4 x 4;Petroleum gauze; Other (Comment) (Bacitracin ointment/ hypafix tape/ spandage) (10/09/17 6215)  :  ]

## 2017-10-10 ENCOUNTER — APPOINTMENT (OUTPATIENT)
Dept: CT IMAGING | Age: 42
DRG: 025 | End: 2017-10-10
Attending: NEUROLOGICAL SURGERY
Payer: COMMERCIAL

## 2017-10-10 LAB
ABO + RH BLD: NORMAL
ANION GAP SERPL CALC-SCNC: 10 MMOL/L (ref 5–15)
BASOPHILS # BLD: 0 K/UL (ref 0–0.1)
BASOPHILS NFR BLD: 0 % (ref 0–1)
BLD PROD TYP BPU: NORMAL
BLD PROD TYP BPU: NORMAL
BLOOD GROUP ANTIBODIES SERPL: NORMAL
BPU ID: NORMAL
BPU ID: NORMAL
BUN SERPL-MCNC: 10 MG/DL (ref 6–20)
BUN/CREAT SERPL: 17 (ref 12–20)
CALCIUM SERPL-MCNC: 6.8 MG/DL (ref 8.5–10.1)
CHLORIDE SERPL-SCNC: 109 MMOL/L (ref 97–108)
CO2 SERPL-SCNC: 21 MMOL/L (ref 21–32)
CREAT SERPL-MCNC: 0.59 MG/DL (ref 0.55–1.02)
CROSSMATCH RESULT,%XM: NORMAL
CROSSMATCH RESULT,%XM: NORMAL
DIFFERENTIAL METHOD BLD: ABNORMAL
EOSINOPHIL # BLD: 0 K/UL (ref 0–0.4)
EOSINOPHIL NFR BLD: 0 % (ref 0–7)
ERYTHROCYTE [DISTWIDTH] IN BLOOD BY AUTOMATED COUNT: 14.9 % (ref 11.5–14.5)
GLUCOSE SERPL-MCNC: 143 MG/DL (ref 65–100)
HCT VFR BLD AUTO: 33.9 % (ref 35–47)
HGB BLD-MCNC: 10.9 G/DL (ref 11.5–16)
LYMPHOCYTES # BLD: 0.8 K/UL (ref 0.8–3.5)
LYMPHOCYTES NFR BLD: 5 % (ref 12–49)
MCH RBC QN AUTO: 28.1 PG (ref 26–34)
MCHC RBC AUTO-ENTMCNC: 32.2 G/DL (ref 30–36.5)
MCV RBC AUTO: 87.4 FL (ref 80–99)
MONOCYTES # BLD: 0.8 K/UL (ref 0–1)
MONOCYTES NFR BLD: 5 % (ref 5–13)
NEUTS SEG # BLD: 14.4 K/UL (ref 1.8–8)
NEUTS SEG NFR BLD: 90 % (ref 32–75)
PLATELET # BLD AUTO: 163 K/UL (ref 150–400)
POTASSIUM SERPL-SCNC: 4.2 MMOL/L (ref 3.5–5.1)
RBC # BLD AUTO: 3.88 M/UL (ref 3.8–5.2)
RBC MORPH BLD: ABNORMAL
SODIUM SERPL-SCNC: 140 MMOL/L (ref 136–145)
SPECIMEN EXP DATE BLD: NORMAL
STATUS OF UNIT,%ST: NORMAL
STATUS OF UNIT,%ST: NORMAL
UNIT DIVISION, %UDIV: 0
UNIT DIVISION, %UDIV: 0
WBC # BLD AUTO: 16 K/UL (ref 3.6–11)

## 2017-10-10 PROCEDURE — 74011250636 HC RX REV CODE- 250/636: Performed by: NURSE PRACTITIONER

## 2017-10-10 PROCEDURE — 97116 GAIT TRAINING THERAPY: CPT

## 2017-10-10 PROCEDURE — 74011000250 HC RX REV CODE- 250: Performed by: NURSE PRACTITIONER

## 2017-10-10 PROCEDURE — 97535 SELF CARE MNGMENT TRAINING: CPT

## 2017-10-10 PROCEDURE — 74011250637 HC RX REV CODE- 250/637: Performed by: NEUROLOGICAL SURGERY

## 2017-10-10 PROCEDURE — 80048 BASIC METABOLIC PNL TOTAL CA: CPT | Performed by: NEUROLOGICAL SURGERY

## 2017-10-10 PROCEDURE — G8979 MOBILITY GOAL STATUS: HCPCS

## 2017-10-10 PROCEDURE — G8978 MOBILITY CURRENT STATUS: HCPCS

## 2017-10-10 PROCEDURE — 36600 WITHDRAWAL OF ARTERIAL BLOOD: CPT

## 2017-10-10 PROCEDURE — 97165 OT EVAL LOW COMPLEX 30 MIN: CPT

## 2017-10-10 PROCEDURE — 36415 COLL VENOUS BLD VENIPUNCTURE: CPT | Performed by: NEUROLOGICAL SURGERY

## 2017-10-10 PROCEDURE — 85025 COMPLETE CBC W/AUTO DIFF WBC: CPT | Performed by: NEUROLOGICAL SURGERY

## 2017-10-10 PROCEDURE — 70450 CT HEAD/BRAIN W/O DYE: CPT

## 2017-10-10 PROCEDURE — 65610000006 HC RM INTENSIVE CARE

## 2017-10-10 PROCEDURE — 74011250636 HC RX REV CODE- 250/636: Performed by: NEUROLOGICAL SURGERY

## 2017-10-10 PROCEDURE — 74011000258 HC RX REV CODE- 258: Performed by: NEUROLOGICAL SURGERY

## 2017-10-10 PROCEDURE — 97161 PT EVAL LOW COMPLEX 20 MIN: CPT

## 2017-10-10 RX ORDER — DEXAMETHASONE SODIUM PHOSPHATE 4 MG/ML
4 INJECTION, SOLUTION INTRA-ARTICULAR; INTRALESIONAL; INTRAMUSCULAR; INTRAVENOUS; SOFT TISSUE EVERY 6 HOURS
Status: DISCONTINUED | OUTPATIENT
Start: 2017-10-10 | End: 2017-10-11

## 2017-10-10 RX ADMIN — DEXAMETHASONE SODIUM PHOSPHATE 4 MG: 4 INJECTION, SOLUTION INTRAMUSCULAR; INTRAVENOUS at 11:53

## 2017-10-10 RX ADMIN — CEFAZOLIN 2 G: 1 INJECTION, POWDER, FOR SOLUTION INTRAMUSCULAR; INTRAVENOUS; PARENTERAL at 05:35

## 2017-10-10 RX ADMIN — SODIUM CHLORIDE 5 MG: 9 INJECTION INTRAMUSCULAR; INTRAVENOUS; SUBCUTANEOUS at 12:10

## 2017-10-10 RX ADMIN — DEXAMETHASONE SODIUM PHOSPHATE 4 MG: 4 INJECTION, SOLUTION INTRAMUSCULAR; INTRAVENOUS at 18:00

## 2017-10-10 RX ADMIN — POTASSIUM CHLORIDE AND SODIUM CHLORIDE: 900; 150 INJECTION, SOLUTION INTRAVENOUS at 14:57

## 2017-10-10 RX ADMIN — Medication 10 ML: at 05:13

## 2017-10-10 RX ADMIN — DEXAMETHASONE SODIUM PHOSPHATE 6 MG: 4 INJECTION, SOLUTION INTRAMUSCULAR; INTRAVENOUS at 05:13

## 2017-10-10 RX ADMIN — ONDANSETRON 4 MG: 2 INJECTION INTRAMUSCULAR; INTRAVENOUS at 02:48

## 2017-10-10 RX ADMIN — LEVETIRACETAM 500 MG: 100 INJECTION, SOLUTION, CONCENTRATE INTRAVENOUS at 21:19

## 2017-10-10 RX ADMIN — HYDROMORPHONE HYDROCHLORIDE 1 MG: 1 INJECTION, SOLUTION INTRAMUSCULAR; INTRAVENOUS; SUBCUTANEOUS at 12:10

## 2017-10-10 RX ADMIN — HYDROMORPHONE HYDROCHLORIDE 1 MG: 1 INJECTION, SOLUTION INTRAMUSCULAR; INTRAVENOUS; SUBCUTANEOUS at 02:49

## 2017-10-10 RX ADMIN — Medication 10 ML: at 21:22

## 2017-10-10 RX ADMIN — Medication 10 ML: at 14:58

## 2017-10-10 RX ADMIN — DEXAMETHASONE SODIUM PHOSPHATE 4 MG: 4 INJECTION, SOLUTION INTRAMUSCULAR; INTRAVENOUS at 23:42

## 2017-10-10 RX ADMIN — POTASSIUM CHLORIDE AND SODIUM CHLORIDE: 900; 150 INJECTION, SOLUTION INTRAVENOUS at 03:02

## 2017-10-10 RX ADMIN — LEVOTHYROXINE SODIUM 100 MCG: 100 TABLET ORAL at 08:29

## 2017-10-10 RX ADMIN — ONDANSETRON 4 MG: 2 INJECTION INTRAMUSCULAR; INTRAVENOUS at 07:08

## 2017-10-10 RX ADMIN — LEVETIRACETAM 500 MG: 100 INJECTION, SOLUTION, CONCENTRATE INTRAVENOUS at 09:10

## 2017-10-10 NOTE — PROGRESS NOTES
0800 Received report, assumed care. Patient is alert, orientedx4, no neuro deficits noted. Denies numbness/tingling. Passed STAND/dysphagia screen, taking PO medications with water. R Radial arterial line and BP cuff both reading SBP <150. Varela draining clear yellow urine. Hemovac patent and charged draining sanguinous fluid. Lumbar drain clamped per orders.  at bedside. 1006 Lumbar drain removed by Russell Arias, Neurosurgical NP.     4907 Arterial line removed per orders. 1200 PRN Zofran given per orders, patient c/o nausea with HOB 30 degrees while working with PT. Patient vomited ~150cc green emesis. Compazine given as well as no relief from Zofran. Complains of pressure headache after vomiting, PRN Dilaudid given as well. 1400 Reports relief with Compazine. Patient agreeable to work with PT.     0 Varela catheter removed per orders. 1700 Patient voided post Varela removal. Family at the bedside. Neuro status remains intact. SHIFT SUMMARY: Lumbar drain removed by Neurosurgery today. Varela and arterial line removed per orders. Dilaudid given x1 for headache associated with nausea/vomiting. Compazine added today, patient reports relief. Neuro status stable, no deficits noted. Worked with PT/OT today, OOBx1 assist. Hemovac draining serosanguinous fluid. Family at the bedside all of today. CAM negative. RASS 0.

## 2017-10-10 NOTE — PROGRESS NOTES
Care Management Interventions  PCP Verified by CM: Yes Elodia Garza MD)  Palliative Care Criteria Met (RRAT>21 & CHF Dx)?: No  Mode of Transport at Discharge:  (spouse will )  Current Support Network: Lives with Spouse (Jessi Hendrix, [phone] (h))  Confirm Follow Up Transport: Family  Plan discussed with Pt/Family/Caregiver: Yes     Chart reviewed. Met with pt to introduce self and role. Pt resting but easily aroused and answered questions appropriately. She is , lives with her , and employed. She has [employer]. She uses [facility] on [address]. She is able to drive but her  has been providing transportation recently. She does not use DME, and has not used home health or home O2. She has an AMD. Her education is college grad. CM will follow for transition of care.      Addi Siegel RN ACM CRM

## 2017-10-10 NOTE — PROGRESS NOTES
Intensivist    39 yo 1 Day Post-Op s/p meningioma resection. Awake and alert. Head CT with expected post op findings and no acute process.   Hemovac in place  On keppra, decadron, and per protocol ancef    Anticipate transfer to [facility] soon    Will be available to see formally if needed    Aniceto Maurice MD

## 2017-10-10 NOTE — PROGRESS NOTES
Neurosurgery Progress Note  Disha [place]  [phone]        Admit Date: 10/9/2017   LOS: 1 day        Daily Progress Note: 10/10/2017    POD:1 Day Post-Op    S/P: Procedure(s):  BRAIN LAB GUIDED BIFRONTAL CRANIOTOMY WITH RESECTION OF ANTERIOR SKULL BASE MENINGIOMA , LUMBAR DRAIN PLACEMENT    Subjective: The patient developed vision loss in her left eye over the past few months. She saw Dr. 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 After discussion with Dr. Smith Robledo, it was decided to pursue surgical resection of the tumor, so she underwent a bifrontal craniotomy with resection of the anterior skull base meningioma. This morning, she is feeling nauseous. She tried to eat a little bit, but she still felt nauseous. She has some head pressure this morning. She states her vision is better than pre-op. She was excited she could read the clock this morning. She had a lumbar drain placed with surgery, but this has been clamped overnight. Denies numbness, tingling, chest pain, leg pain,  difficulty swallowing, and dyspnea. Objective:     Vital signs  Temp (24hrs), Av.6 °F (37.6 °C), Min:99.1 °F (37.3 °C), Max:100.8 °F (38.2 °C)   10/10 0701 - 10/10 1900  In: 453.3 [I.V.:453.3]  Out: 100 [Urine:100]  10/08 1901 - 10/10 07  In: 4681.3 [I.V.:4681.3]  Out: 8510 [Urine:3635; Drains:200]    Visit Vitals    BP 97/61    Pulse 65    Temp 99.5 °F (37.5 °C)    Resp 14    Ht 5' 2\" (1.575 m)    Wt 73.2 kg (161 lb 6 oz)    LMP 2017 (Approximate)    SpO2 98%    BMI 29.52 kg/m2    O2 Flow Rate (L/min): 2 l/min O2 Device: Room air     Pain control  Pain Assessment  Pain Scale 1: Numeric (0 - 10)  Pain Intensity 1: 0  Pain Intervention(s) 1: Medication (see MAR)    PT/OT  Gait                 Physical Exam:  Gen:LE Medley looking. Neuro: A&Ox3. Follows commands. Speech clear. Affect flat  PERRL. EOMI. Face symmetric.  Tongue midline. IVORY. Strength 5/5 in UE and LE BL. Negative drift. Gait deferred. Skin: Dressing bifrontal area C/D/I  Heart RRR  Lungs CTA BL  Abd soft, NT.  Hypoactive bowel sounds  Ext no edema    24 hour results:    Recent Results (from the past 24 hour(s))   POC G3 - PUL    Collection Time: 10/09/17 11:25 AM   Result Value Ref Range    FIO2 (POC) 0.50 %    pH (POC) 7.403 7.35 - 7.45      pCO2 (POC) 38.4 35.0 - 45.0 MMHG    pO2 (POC) 209 (H) 80 - 100 MMHG    HCO3 (POC) 24.0 22 - 26 MMOL/L    sO2 (POC) 100 (H) 92 - 97 %    Base deficit (POC) 1 mmol/L    Site OTHER      Device: VENT      Mode ASSIST CONTROL      Tidal volume 470 ml    Set Rate 10 bpm    PEEP/CPAP (POC) 5 cmH2O    Allens test (POC) N/A      Specimen type (POC) ARTERIAL      Volume control YES     POC G3 - PUL    Collection Time: 10/09/17 12:02 PM   Result Value Ref Range    FIO2 (POC) 50 %    pH (POC) 7.459 (H) 7.35 - 7.45      pCO2 (POC) 33.8 (L) 35.0 - 45.0 MMHG    pO2 (POC) 222 (H) 80 - 100 MMHG    HCO3 (POC) 24.0 22 - 26 MMOL/L    sO2 (POC) 100 (H) 92 - 97 %    Base excess (POC) 0 mmol/L    Site DRAWN FROM ARTERIAL LINE      Device: VENT      Mode ASSIST CONTROL      Tidal volume 525 ml    Set Rate 12 bpm    PEEP/CPAP (POC) 5 cmH2O    Allens test (POC) N/A      Specimen type (POC) ARTERIAL     METABOLIC PANEL, BASIC    Collection Time: 10/10/17  4:10 AM   Result Value Ref Range    Sodium 140 136 - 145 mmol/L    Potassium 4.2 3.5 - 5.1 mmol/L    Chloride 109 (H) 97 - 108 mmol/L    CO2 21 21 - 32 mmol/L    Anion gap 10 5 - 15 mmol/L    Glucose 143 (H) 65 - 100 mg/dL    BUN 10 6 - 20 MG/DL    Creatinine 0.59 0.55 - 1.02 MG/DL    BUN/Creatinine ratio 17 12 - 20      GFR est AA >60 >60 ml/min/1.73m2    GFR est non-AA >60 >60 ml/min/1.73m2    Calcium 6.8 (L) 8.5 - 10.1 MG/DL   CBC WITH AUTOMATED DIFF    Collection Time: 10/10/17  4:10 AM   Result Value Ref Range    WBC 16.0 (H) 3.6 - 11.0 K/uL    RBC 3.88 3.80 - 5.20 M/uL    HGB 10.9 (L) 11.5 - 16.0 g/dL HCT 33.9 (L) 35.0 - 47.0 %    MCV 87.4 80.0 - 99.0 FL    MCH 28.1 26.0 - 34.0 PG    MCHC 32.2 30.0 - 36.5 g/dL    RDW 14.9 (H) 11.5 - 14.5 %    PLATELET 323 555 - 737 K/uL    NEUTROPHILS 90 (H) 32 - 75 %    LYMPHOCYTES 5 (L) 12 - 49 %    MONOCYTES 5 5 - 13 %    EOSINOPHILS 0 0 - 7 %    BASOPHILS 0 0 - 1 %    ABS. NEUTROPHILS 14.4 (H) 1.8 - 8.0 K/UL    ABS. LYMPHOCYTES 0.8 0.8 - 3.5 K/UL    ABS. MONOCYTES 0.8 0.0 - 1.0 K/UL    ABS. EOSINOPHILS 0.0 0.0 - 0.4 K/UL    ABS. BASOPHILS 0.0 0.0 - 0.1 K/UL    DF SMEAR SCANNED      RBC COMMENTS NORMOCYTIC, NORMOCHROMIC            Assessment:     Active Problems:    Meningioma (HCC) (10/9/2017)        Plan:   1. Meningioma, anterior skull base   - s/p crani 10/09   - Normalize and mobilize   - Cont decadron   - Will d/c lumbar drain.   - Half-charge HVAC   - PT/OT evals  2. Cerebral edema and brain compression   - due to #1   - plans as above  3.  Hypothyroidism   - Cont levothyroxine from home    Activity: up with assist  DVT ppx: SCDs  Dispo: tbd    Plan d/w Dr. Delmar Og, NP

## 2017-10-10 NOTE — PROGRESS NOTES
Problem: Mobility Impaired (Adult and Pediatric)  Goal: *Acute Goals and Plan of Care (Insert Text)  Physical Therapy Goals  Initiated 10/10/2017  1. Patient will move from supine to sit and sit to supine and scoot up and down in bed with modified independence within 7 day(s). 2. Patient will transfer from bed to chair and chair to bed with modified independence using the least restrictive device within 7 day(s). 3. Patient will perform sit to stand with modified independence within 7 day(s). 4. Patient will ambulate with modified independence for 300 feet with the least restrictive device within 7 day(s). 5. Patient will ascend/descend 10 stairs with handrail(s) with modified independence within 7 day(s). 6. Patient will improve Sam[place]go Balance score by 7 points within 7 days. PHYSICAL THERAPY EVALUATION- NEURO POPULATION     Patient: Dulce Turpin (43 y.o. female)  Date: 10/10/2017  Primary Diagnosis:  MENINGOMA   Meningioma ([facility] [address]  Procedure(s) (LRB):  BRAIN LAB GUIDED BIFRONTAL CRANIOTOMY WITH RESECTION OF ANTERIOR SKULL BASE MENINGIOMA , LUMBAR DRAIN PLACEMENT (N/A) 1 Day Post-Op   Precautions:   Fall (Hemovac)      ASSESSMENT :  Based on the objective data described below, the patient presents with impaired functional mobility as compared to baseline level 2* generalized decreased functional strength (<L UE), impaired L UE coordination, decreased tolerance to activity (low BPs, nausea, dizziness), and impaired standing balance s/p bifrontal craniotomy with meningioma resection, POD 1. Prior to admission, pt reports that she lived at home with her spouse and family (multiple stairs to navigate) and was indep with all ADLs and mobility w/o use of AD. She reports that the vision in her L eye has greatly improved as compared to pre op - was able to correctly identify objects/read name badge with R eye covered.  She required up to min A for transfers and initiation of gait training with narrowed base of support (near scissor step on occasion), decreased step lengths, minor posterior lean, and decreased foot clearance noted. Pt remained up in chair at end of session. Anticipate that pt will continue to progress well, especially once pain and nausea are better controlled. Recommending discharge to rehab vs home with family assist and HHPT pending ongoing progress. Patient will benefit from skilled intervention to address the above impairments. Patients rehabilitation potential is considered to be Good  Factors which may influence rehabilitation potential include:   [ ]           None noted  [ ]           Mental ability/status  [ ]           Medical condition  [ ]           Home/family situation and support systems  [ ]           Safety awareness  [ ]           Pain tolerance/management  [ ]           Other:        PLAN :  Recommendations and Planned Interventions:  [ ]             Bed Mobility Training             [ ]      Neuromuscular Re-Education  [ ]             Transfer Training                   [ ]      Orthotic/Prosthetic Training  [ ]             Gait Training                         [ ]      Modalities  [ ]             Therapeutic Exercises           [ ]      Edema Management/Control  [ ]             Therapeutic Activities            [ ]      Patient and Family Training/Education  [ ]             Other (comment):  Frequency/Duration: Patient will be followed by physical therapy 5 times a week to address goals. Discharge Recommendations: Rehab and Home Health  Further Equipment Recommendations for Discharge: TBD       SUBJECTIVE:   Patient stated I'm a little better.       OBJECTIVE DATA SUMMARY:   HISTORY:    Past Medical History:   Diagnosis Date    Thyroid disease       Past Surgical History:   Procedure Laterality Date    HX HEENT         WISDOM TEETH REMOVED     Prior Level of Function/Home Situation: lives at home with spouse and family - very supportive.  10 steps to navigate, indep without use of AD  Personal factors and/or comorbidities impacting plan of care:      Home Situation  Home Environment: Private residence  # Steps to Enter: 5  One/Two Story Residence: Two story  # of Interior Steps: 10  Living Alone: No  Support Systems: Family member(s), Spouse/Significant Other/Partner  Patient Expects to be Discharged to[de-identified] Private residence  Current DME Used/Available at Home: None     EXAMINATION/PRESENTATION/DECISION MAKING:   Critical Behavior:  Neurologic State: Drowsy, Eyes open to stimulus  Orientation Level: Oriented X4  Cognition: Follows commands, Decreased attention/concentration  Safety/Judgement: Awareness of environment, Good awareness of safety precautions  Hearing: Auditory  Auditory Impairment: None  Skin:  Intact where exposed. Edema: none noted  Range Of Motion:  AROM: Generally decreased, functional     Strength:    Strength: Generally decreased, functional     Tone & Sensation:   Tone: Normal     Sensation: Intact      Coordination:  Coordination: Generally decreased, functional (LUE < R UE)  Vision:   Tracking: Able to track stimulus in all quadrants w/o difficulty  Diplopia: No  Acuity: Able to read employee name badge without difficulty  Functional Mobility:  Bed Mobility:  Rolling: Stand-by asssistance  Supine to Sit: Stand-by asssistance  Scooting: Stand-by asssistance  Transfers:  Sit to Stand: Minimum assistance  Stand to Sit: Minimum assistance     Balance:   Sitting: Intact; With support  Standing: Impaired  Standing - Static: Fair;Constant support  Standing - Dynamic : Fair  Ambulation/Gait Training:  Distance (ft): 75 Feet (ft)  Assistive Device: Gait belt (B HHA)  Ambulation - Level of Assistance: Minimal assistance;Assist x2  Gait Description (WDL): Exceptions to WDL  Gait Abnormalities: Decreased step clearance;Scissoring;Path deviations;Trunk sway increased; Shuffling gait  Base of Support: Narrowed  Speed/Carlee: Slow;Shuffled  Step Length: Right shortened;Left shortened        Functional Measure  [place] Balance Test:      Sitting to Standin  Standing Unsupported: 0  Sitting with Back Unsupported: 4  Standing to Sittin  Transfers: 1  Standing Unsupported with Eyes Closed: 0  Standing Unsupported with Feet Together: 0  Reach Forward with Outstretched Arm: 0   Object: 0  Turn to Look Over Shoulders: 0  Turn 360 Degrees: 0  Alternate Foot on Step/Stool: 0  Standing Unsupported One Foot in Front: 0  Stand on One Le  Total: 7             56=Maximum possible score;   0-20=High fall risk  21-40=Moderate fall risk   41-56=Low fall risk      [place] Balance Test and G-code impairment scale:  Percentage of Impairment CH     0%    CI     1-19% CJ     20-39% CK     40-59% CL     60-79% CM     80-99% CN      100%   [place]   Score 0-56 56 45-55 34-44 23-33 12-22 1-11 0         G codes: In compliance with CMSs Claims Based Outcome Reporting, the following G-code set was chosen for this patient based on their primary functional limitation being treated: The outcome measure chosen to determine the severity of the functional limitation was the Carrera with a score of 7/56 which was correlated with the impairment scale.       · Mobility - Walking and Moving Around:               - CURRENT STATUS:    CM - 80%-99% impaired, limited or restricted               - GOAL STATUS:           CL - 60%-79% impaired, limited or restricted               - D/C STATUS:                       ---------------To be determined---------------       Physical Therapy Evaluation Charge Determination   History Examination Presentation Decision-Making   MEDIUM  Complexity : 1-2 comorbidities / personal factors will impact the outcome/ POC  MEDIUM Complexity : 3 Standardized tests and measures addressing body structure, function, activity limitation and / or participation in recreation  LOW Complexity : Stable, uncomplicated  HIGH Complexity : FOTO score of 1- 25       Based on the above components, the patient evaluation is determined to be of the following complexity level: LOW      Pain:  Pain Scale 1: Numeric (0 - 10)  Pain Intensity 1: 0         Activity Tolerance:   VSS, BP low at rest (90s/50s) - negative for orthostatic changes and asymptomatic      Please refer to the flowsheet for vital signs taken during this treatment. After treatment:   [X]     Patient left in no apparent distress sitting up in chair  [ ]     Patient left in no apparent distress in bed  [X]     Call bell left within reach  [X]     Nursing notified  [ ]     Caregiver present  [ ]     Bed alarm activated      COMMUNICATION/EDUCATION:   The patients plan of care was discussed with: Occupational Therapist and Registered Nurse.     [X]  Fall prevention education was provided and the patient/caregiver indicated understanding. [X]  Patient/family have participated as able in goal setting and plan of care. [X]  Patient/family agree to work toward stated goals and plan of care. [ ]  Patient understands intent and goals of therapy, but is neutral about his/her participation. [ ]  Patient is unable to participate in goal setting and plan of care.      Thank you for this referral.  Jovan Sue, PT , DPT   Time Calculation: 28 mins

## 2017-10-10 NOTE — PROGRESS NOTES
Problem: Self Care Deficits Care Plan (Adult)  Goal: *Acute Goals and Plan of Care (Insert Text)  Occupational Therapy Goals Initiated 10/10/17  1. Patient will complete toilet transfer with supervision within 7 days. 2. Patient will complete toileting task with supervision within 7 days. 3. Patient will complete grooming task standing at sink with supervision within 7 days. 4. Patient will complete LB dressing task with Mod A within 7 days. 5.  Patient will complete light IADL tasks with supervision within 7 days. OCCUPATIONAL THERAPY EVALUATION  Patient: Júnior Eller (43 y.o. female)  Date: 10/10/2017  Primary Diagnosis:  MENINGOMA   Meningioma ([facility] [address]  Procedure(s) (LRB):  BRAIN LAB GUIDED BIFRONTAL CRANIOTOMY WITH RESECTION OF ANTERIOR SKULL BASE MENINGIOMA , LUMBAR DRAIN PLACEMENT (N/A) 1 Day Post-Op   Precautions: Fall, Seizure  Fall (Hemovac)      ASSESSMENT :  Based on the objective data described below, the patient presents with weakness, reduced coordination, and decreased balance reducing pt's independence and safety with ADLs and functional mobility secondary to bifrontal craniotomy with resection of anterior skull base meningioma. Pt was fatigued but cooperative during session. Pt required no more than CGAx2 for mobility tasks, but pt's balance and gait difficulties remain significant fall hazards. Pt did not keep eyes open while seated during session, but completed grooming task without obvious cognitive deficits. Further assessment of cognition in future session would provide additional information for treatment and discharge planning. Pt would benefit from additional acute OT services to promote greater activity tolerance and safety while completing ADLs and functional mobility tasks. Recommendation for inpatient rehab services to maximize independence and safety with self care tasks and related mobility before discharge to home.    Patient will benefit from skilled intervention to address the above impairments. Patients rehabilitation potential is considered to be Good  Factors which may influence rehabilitation potential include:   [ ]                None noted  [ ]                Mental ability/status  [X]                Medical condition  [X]                Home/family situation and support systems  [X]                Safety awareness  [X]                Pain tolerance/management  [ ]                Other:        PLAN :  Recommendations and Planned Interventions:  [X]                  Self Care Training                  [X]           Therapeutic Activities  [X]                  Functional Mobility Training    [X]           Cognitive Retraining  [X]                  Therapeutic Exercises           [X]           Endurance Activities  [X]                  Balance Training                   [X]           Neuromuscular Re-Education  [X]                  Visual/Perceptual Training     [X]      Home Safety Training  [X]                  Patient Education                 [X]           Family Training/Education  [ ]                  Other (comment):     Frequency/Duration: Patient will be followed by occupational therapy 5 times a week to address goals. Discharge Recommendations: Inpatient Rehab  Further Equipment Recommendations for Discharge: transfer bench and grab bars in bathroom       SUBJECTIVE:   Patient stated I feel better now.       OBJECTIVE DATA SUMMARY:   HISTORY:   Past Medical History:   Diagnosis Date    Thyroid disease      Past Surgical History:   Procedure Laterality Date    HX HEENT      WISDOM TEETH REMOVED        Prior Level of Function/Home Situation: Independent/Living with spouse and two children in private residence  Expanded or extensive additional review of patient history:      Home Situation  Home Environment: Private residence  # Steps to Enter: 5  One/Two Story Residence: Two story  # of Interior Steps: 10  Living Alone: No  Support Systems: Family member(s), Spouse/Significant Other/Partner  Patient Expects to be Discharged to[de-identified] Private residence  Current DME Used/Available at Home: None  [X]  Right hand dominant             [ ]  Left hand dominant     EXAMINATION OF PERFORMANCE DEFICITS:  Cognitive/Behavioral Status:  Neurologic State: Drowsy; Eyes open to stimulus  Orientation Level: Oriented X4  Cognition: Follows commands;Decreased attention/concentration  Perception: Appears intact  Perseveration: No perseveration noted  Safety/Judgement: Awareness of environment;Good awareness of safety precautions     Skin: See nursing note. Edema: No significant edema observed during session. Hearing: Auditory  Auditory Impairment: None     Vision/Perceptual:    Tracking: Able to track stimulus in all quadrants w/o difficulty                 Diplopia: No    Acuity: Able to read employee name badge without difficulty          Range of Motion:  AROM: Generally decreased, functional                          Strength:  Strength: Generally decreased, functional                 Coordination:  Coordination: Generally decreased, functional (LUE < R UE)  Fine Motor Skills-Upper: Left Impaired;Right Intact    Gross Motor Skills-Upper: Left Impaired;Right Intact     Tone & Sensation:  Tone: Normal  Sensation: Intact                       Balance:  Sitting: Intact; With support  Standing: Impaired  Standing - Static: Fair;Constant support  Standing - Dynamic : Fair     Functional Mobility and Transfers for ADLs:  Bed Mobility:  Rolling: Stand-by asssistance  Supine to Sit: Stand-by asssistance  Scooting: Stand-by asssistance     Transfers:  Sit to Stand: CG; assist x2         ADL Assessment:  Feeding: Setup     Oral Facial Hygiene/Grooming: Setup     Bathing: Total assistance     Upper Body Dressing: Minimum assistance     Lower Body Dressing: Total assistance     Toileting:  Total assistance                 ADL Intervention and task modifications:   Pt was encountered supine in bed with HOB elevated. Pt transitioned from supine in bed to sitting at EOB with SBA. Pt scooted to EOB to place feet on floor with SBA. Pt performed sit to stand with CGA. Pt performed functional mobility  ~20 ft from EOB to hallway and back with hand held assist x2 for balance. Pt sat in chair with CGA after returning to room. Pt completed grooming task with setup while seated in chair. Pt was left in chair with call bell within reach and family member present. Grooming  Grooming Assistance: Supervision/set up  Brushing Teeth: Supervision/set-up         Cognitive Retraining  Safety/Judgement: Awareness of environment;Good awareness of safety precautions     Functional Measure:   Barthel Index:      Bathin  Bladder: 10  Bowels: 10  Groomin  Dressin  Feedin  Mobility: 0  Stairs: 0  Toilet Use: 0  Transfer (Bed to Chair and Back): 5  Total: 35         Barthel and G-code impairment scale:  Percentage of impairment CH  0% CI  1-19% CJ  20-39% CK  40-59% CL  60-79% CM  80-99% CN  100%   Barthel Score 0-100 100 99-80 79-60 59-40 20-39 1-19    0   Barthel Score 0-20 20 17-19 13-16 9-12 5-8 1-4 0      The Barthel ADL Index: Guidelines  1. The index should be used as a record of what a patient does, not as a record of what a patient could do. 2. The main aim is to establish degree of independence from any help, physical or verbal, however minor and for whatever reason. 3. The need for supervision renders the patient not independent. 4. A patient's performance should be established using the best available evidence. Asking the patient, friends/relatives and nurses are the usual sources, but direct observation and common sense are also important. However direct testing is not needed. 5. Usually the patient's performance over the preceding 24-48 hours is important, but occasionally longer periods will be relevant. 6. Middle categories imply that the patient supplies over 50 per cent of the effort.   7. Use of aids to be independent is allowed. Twila Tong., Barthel, D.W. (7202). Functional evaluation: the Barthel Index. [address] (14)2. EDWIGE Sandoval, Sarahi Garcia., Daisy Case, [address] [facility] (1999). Measuring the change indisability after inpatient rehabilitation; comparison of the responsiveness of the Barthel Index and Functional [place] Measure. Journal of Neurology, Neurosurgery, and Psychiatry, 66(4), 029-061. AMOS Turner, YUE Cowan, & Lexus Haddad M.A. (2004.) Assessment of post-stroke quality of life in cost-effectiveness studies: The usefulness of the Barthel Index and the EuroQoL-5D. Quality of Life Research, 13, 399-03        G codes: In compliance with CMSs Claims Based Outcome Reporting, the following G-code set was chosen for this patient based on their primary functional limitation being treated: The outcome measure chosen to determine the severity of the functional limitation was the Barthel Index with a score of 35/100 which was correlated with the impairment scale. · Self Care:               - CURRENT STATUS:    CL - 60%-79% impaired, limited or restricted               - GOAL STATUS:           CK - 40%-59% impaired, limited or restricted               - D/C STATUS:                       ---------------To be determined---------------       Occupational Therapy Evaluation Charge Determination   History Examination Decision-Making   MEDIUM Complexity : Expanded review of history including physical, cognitive and psychosocial  history  MEDIUM Complexity : 3-5 performance deficits relating to physical, cognitive , or psychosocial skils that result in activity limitations and / or participation restrictions MEDIUM Complexity : Patient may present with comorbidities that affect occupational performnce.  Miniml to moderate modification of tasks or assistance (eg, physical or verbal ) with assesment(s) is necessary to enable patient to complete evaluation       Based on the above components, the patient evaluation is determined to be of the following complexity level: MEDIUM     Pain:  Pain Scale 1: Numeric (0 - 10)  Pain Intensity 1: 0              Activity Tolerance:   Pt tolerated session activities well, but was fatigued by end of session. Pt did not experience significant changes in VS or symptoms of orthostatic hypotension. Please refer to the flowsheet for vital signs taken during this treatment. After treatment:   [X]  Patient left in no apparent distress sitting up in chair  [ ]  Patient left in no apparent distress in bed  [X]  Call bell left within reach  [ ]  Nursing notified  [X]  Caregiver present  [ ]  Bed alarm activated      COMMUNICATION/EDUCATION:   The patients plan of care was discussed with: Physical Therapist.     [ ]      Home safety education was provided and the patient/caregiver indicated understanding. [X]      Patient/family have participated as able and agree with findings and recommendations. [ ]      Patient is unable to participate in plan of care at this time. This patients plan of care is appropriate for delegation to [place]. Thank you for this referral.  CLAUDE Palacios     Regarding student involvement in patient care:  A student participated in this treatment session. Per CMS Medicare statements and AOTA guidelines I certify that the following was true:  1. I was present and directly observed the entire session. 2. I made all skilled judgments and clinical decisions regarding care. 3. I am the practitioner responsible for assessment, treatment, and documentation.      Time Calculation: 40 mins      Sundeep Marti OT

## 2017-10-10 NOTE — PROGRESS NOTES
10/09/17 1930: Bedside shift change report given to Constantino Funk RN (oncoming nurse) by Ismael Kirk (offgoing nurse). Report included the following information SBAR, Kardex, Procedure Summary, Intake/Output, MAR, Accordion, Recent Results, Cardiac Rhythm NSR and Alarm Parameters . SHIFT SUMMARY: Dilauded x 2 for pain management. 1 episode of vomiting and nausea relieved with zofran. VSS and patient is now A&O x 4.

## 2017-10-10 NOTE — PROGRESS NOTES
Physical therapy note  10/10/17    1145: Order acknowledged and chart reviewed. Pt cleared for mobilization by RN s/p lumbar drain removal and completion of bed rest. Initiated evaluation however once HOB elevated slightly, pt with multiple episodes of projectile vomiting despite prior administration of anti-nausea medication. Session aborted as vomiting continued. Will plan to follow up this PM to complete.     Thank you,  Mady Geiger, PT, DPT

## 2017-10-11 PROCEDURE — 74011250637 HC RX REV CODE- 250/637: Performed by: NURSE PRACTITIONER

## 2017-10-11 PROCEDURE — 97535 SELF CARE MNGMENT TRAINING: CPT

## 2017-10-11 PROCEDURE — 97116 GAIT TRAINING THERAPY: CPT

## 2017-10-11 PROCEDURE — 65660000000 HC RM CCU STEPDOWN

## 2017-10-11 PROCEDURE — 74011250636 HC RX REV CODE- 250/636: Performed by: NEUROLOGICAL SURGERY

## 2017-10-11 PROCEDURE — 74011250637 HC RX REV CODE- 250/637: Performed by: NEUROLOGICAL SURGERY

## 2017-10-11 RX ORDER — LEVETIRACETAM 500 MG/1
500 TABLET ORAL 2 TIMES DAILY
Status: DISCONTINUED | OUTPATIENT
Start: 2017-10-11 | End: 2017-10-12

## 2017-10-11 RX ORDER — DOCUSATE SODIUM 100 MG/1
100 CAPSULE, LIQUID FILLED ORAL DAILY
Status: DISCONTINUED | OUTPATIENT
Start: 2017-10-11 | End: 2017-10-13 | Stop reason: HOSPADM

## 2017-10-11 RX ORDER — LEVETIRACETAM 500 MG/1
500 TABLET ORAL 2 TIMES DAILY
Status: DISCONTINUED | OUTPATIENT
Start: 2017-10-11 | End: 2017-10-11

## 2017-10-11 RX ORDER — FAMOTIDINE 20 MG/1
20 TABLET, FILM COATED ORAL 2 TIMES DAILY
Status: DISCONTINUED | OUTPATIENT
Start: 2017-10-11 | End: 2017-10-12

## 2017-10-11 RX ORDER — ADHESIVE BANDAGE
30 BANDAGE TOPICAL DAILY PRN
Status: DISCONTINUED | OUTPATIENT
Start: 2017-10-11 | End: 2017-10-13 | Stop reason: HOSPADM

## 2017-10-11 RX ORDER — SIMETHICONE 80 MG
80 TABLET,CHEWABLE ORAL
Status: DISCONTINUED | OUTPATIENT
Start: 2017-10-11 | End: 2017-10-13 | Stop reason: HOSPADM

## 2017-10-11 RX ORDER — DEXAMETHASONE 4 MG/1
4 TABLET ORAL EVERY 8 HOURS
Status: DISCONTINUED | OUTPATIENT
Start: 2017-10-11 | End: 2017-10-13 | Stop reason: HOSPADM

## 2017-10-11 RX ADMIN — Medication 10 ML: at 06:40

## 2017-10-11 RX ADMIN — POTASSIUM CHLORIDE AND SODIUM CHLORIDE: 900; 150 INJECTION, SOLUTION INTRAVENOUS at 03:27

## 2017-10-11 RX ADMIN — Medication 10 ML: at 13:28

## 2017-10-11 RX ADMIN — SIMETHICONE CHEW TAB 80 MG 80 MG: 80 TABLET ORAL at 16:32

## 2017-10-11 RX ADMIN — LEVETIRACETAM 500 MG: 500 TABLET, FILM COATED ORAL at 17:50

## 2017-10-11 RX ADMIN — FAMOTIDINE 20 MG: 20 TABLET ORAL at 17:50

## 2017-10-11 RX ADMIN — DEXAMETHASONE 4 MG: 4 TABLET ORAL at 22:21

## 2017-10-11 RX ADMIN — FAMOTIDINE 20 MG: 20 TABLET ORAL at 08:27

## 2017-10-11 RX ADMIN — LEVETIRACETAM 500 MG: 500 TABLET, FILM COATED ORAL at 09:34

## 2017-10-11 RX ADMIN — DEXAMETHASONE SODIUM PHOSPHATE 4 MG: 4 INJECTION, SOLUTION INTRAMUSCULAR; INTRAVENOUS at 06:39

## 2017-10-11 RX ADMIN — DOCUSATE SODIUM 100 MG: 100 CAPSULE, LIQUID FILLED ORAL at 08:28

## 2017-10-11 RX ADMIN — LEVOTHYROXINE SODIUM 100 MCG: 100 TABLET ORAL at 06:38

## 2017-10-11 RX ADMIN — DEXAMETHASONE 4 MG: 4 TABLET ORAL at 08:33

## 2017-10-11 RX ADMIN — Medication 10 ML: at 22:21

## 2017-10-11 RX ADMIN — SIMETHICONE CHEW TAB 80 MG 80 MG: 80 TABLET ORAL at 22:21

## 2017-10-11 RX ADMIN — DEXAMETHASONE 4 MG: 4 TABLET ORAL at 13:28

## 2017-10-11 NOTE — PROGRESS NOTES
Problem: Self Care Deficits Care Plan (Adult)  Goal: *Acute Goals and Plan of Care (Insert Text)  Occupational Therapy Goals Initiated 10/10/17  1. Patient will complete toilet transfer with supervision within 7 days. 2. Patient will complete toileting task with supervision within 7 days. 3. Patient will complete grooming task standing at sink with supervision within 7 days. 4. Patient will complete LB dressing task with Mod A within 7 days. 5. Patient will complete light IADL tasks with supervision within 7 days. OCCUPATIONAL THERAPY TREATMENT  Patient: Ayesha Rebolledo (43 y.o. female)  Date: 10/11/2017  Diagnosis:  MENINGOMA   Meningioma ([facility] [address] <principal problem not specified>  Procedure(s) (LRB):  BRAIN LAB GUIDED BIFRONTAL CRANIOTOMY WITH RESECTION OF ANTERIOR SKULL BASE MENINGIOMA , LUMBAR DRAIN PLACEMENT (N/A) 2 Days Post-Op  Precautions: Fall (Hemovac)      ASSESSMENT:  Patient received seated on BSC with PCT present. Patient reporting feeling much better today, no nausea noted. Patient able to complete all functional transfers with supervision-CGA x1. Ambulated entire unit with CGA x1 (please see PT note for further details). While ambulating unit patient able to accurately read varying size and distance text around unit. Reporting no abnormal vision sensations and has significantly improved post-op. Patient with intact coordination in [address], demonstrating some difficulty only d/t multiple IV sites and discomfort. Patient was able to demonstrate intact use throughout ADLs. Patient progressing very well today vs yesterday, with continued progression will likely be able to return home with HHOT/PT services and family support.   Progression toward goals:  [X]       Improving appropriately and progressing toward goals  [ ]       Improving slowly and progressing toward goals  [ ]       Not making progress toward goals and plan of care will be adjusted       PLAN:  Patient continues to benefit from skilled intervention to address the above impairments. Continue treatment per established plan of care. Discharge Recommendations:  HHOT vs TBD  Further Equipment Recommendations for Discharge:  TBD       SUBJECTIVE:   Patient stated I am so much better than prior to surgery.       OBJECTIVE DATA SUMMARY:   Cognitive/Behavioral Status:  Neurologic State: Alert  Orientation Level: Oriented X4  Cognition: Appropriate decision making; Appropriate for age attention/concentration; Appropriate safety awareness; Follows commands  Perception: Appears intact  Perseveration: No perseveration noted  Safety/Judgement: Awareness of environment; Insight into deficits;Home safety;Good awareness of safety precautions     Functional Mobility and Transfers for ADLs:  Transfers:  Sit to Stand: Contact guard assistance;Stand-by asssistance        Balance:  Sitting: Intact  Standing: Impaired; Without support  Standing - Static: Good  Standing - Dynamic : Good     ADL Intervention:  Feeding  Feeding Assistance: Independent  Container Management: Independent  Utensil Management: Independent  Food to Mouth: Independent  Drink to Mouth: Independent     Cognitive Retraining  Safety/Judgement: Awareness of environment; Insight into deficits;Home safety;Good awareness of safety precautions     Pain:  Pain Scale 1: Numeric (0 - 10)  Pain Intensity 1: 0              Activity Tolerance:   Good. Please refer to the flowsheet for vital signs taken during this treatment.   After treatment:   [X] Patient left in no apparent distress sitting up in chair  [ ] Patient left in no apparent distress in bed  [X] Call bell left within reach  [X] Nursing notified  [X] Caregiver present  [ ] Bed alarm activated      COMMUNICATION/COLLABORATION:   The patients plan of care was discussed with: Physical Therapist and Registered Nurse     Lizett Heart OT  Time Calculation: 12 mins

## 2017-10-11 NOTE — ROUTINE PROCESS
0730 Bedside and Verbal shift change report given to Tisha Worthington RN and Essence Julio, student nurse (oncoming nurse) by Damian Siddiqui RN (offgoing nurse). Report included the following information SBAR, Kardex, OR Summary, Intake/Output, MAR, Accordion and Recent Results.

## 2017-10-11 NOTE — PROGRESS NOTES
Primary Nurse Fabrice Parents and Anca Gordillo RN performed a dual skin assessment on this patient No impairment noted except surgical incision on top of head.   Hardy score is 21

## 2017-10-11 NOTE — PROGRESS NOTES
0800 Received report and assumed care of patient. A/Ox4. Neuro intact. Family at bedside. No complaints at this time, VSS.    0900 Hemovac removed by neurosurgical NP. Neuro checks advanced to q4 hours. Diet advanced. Patient has transfer orders to [facility]. 1030 Interdisciplinary rounds held-no new orders received. 1500 Shift summary: Uneventful. Neuro remains intact. OOBx1 assist. RA. Voiding. Working with PT and OT.

## 2017-10-11 NOTE — PROGRESS NOTES
TRANSFER - IN REPORT:    Verbal report received from [facility] [place], RN (name) on Antoine Govea  being received from ICU (unit) for routine progression of care      Report consisted of patients Situation, Background, Assessment and   Recommendations(SBAR). Information from the following report(s) SBAR, Kardex, OR Summary, Intake/Output, MAR, Recent Results and Cardiac Rhythm NSR was reviewed with the receiving nurse. Opportunity for questions and clarification was provided. Assessment completed upon patients arrival to unit and care assumed.

## 2017-10-11 NOTE — PROGRESS NOTES
Problem: Falls - Risk of  Goal: *Absence of Falls  Document Qamar Fall Risk and appropriate interventions in the flowsheet. Outcome: Progressing Towards Goal  Fall Risk Interventions:  Mobility Interventions: Patient to call before getting OOB     Mentation Interventions:  Toileting rounds     Medication Interventions: Teach patient to arise slowly, Patient to call before getting OOB     Elimination Interventions: Call light in reach, Toileting schedule/hourly rounds

## 2017-10-11 NOTE — OP NOTES
[address]   174 [facility], [address] [place] [address]   OP NOTE       Name:  Manjeet Grimm   MR#:  [id number]   :  1975   Account #:  [de-identified]    Surgery Date:  10/09/2017   Date of Adm:  10/09/2017       PREOPERATIVE DIAGNOSIS: Large planum sphenoidale    meningioma. POSTOPERATIVE DIAGNOSIS: Large planum sphenoidale    meningioma. PROCEDURES PERFORMED:     1. BrainLAB guided stereotactic bifrontal craniotomy with supraorbital   osteotomy, anterior skull base resection of planum sphenoidale   meningioma via subfrontal approach,  placement of lumbar drain,   cranioplasty less than 5 cm with Hydrocet cement. 2. Use of operating microscope. SURGEON: Romeo Green. Adina Cook MD    ASSISTANT: General Boast, MD    ESTIMATED BLOOD LOSS: 200 mL. SPECIMENS REMOVED: Tumor for frozen and permanent section. Frozen section diagnosis consistent with meningioma. ANESTHESIA:  General endotracheal anesthesia. COMPLICATIONS: None. ANESTHESIA: General endotracheal anesthesia. OPERATIVE INDICATIONS: A 46-year-old female with progressive   visual loss in her left eye. Workup revealed a large 3.5 cm planum   sphenoidale meningioma bilaterally causing frontal lobe edema. After   discussing treatment options and risks of surgery with the patient and   her family, informed consent was obtained. DESCRIPTION OF PROCEDURE: The patient was taken to the   operating room, placed under general endotracheal anesthesia. All   necessary lines and monitors were placed. She was given an   appropriate dose of IV antibiotics. SCDs and Varela were placed. She   was turned on her side. In a sterile fashion, a lumbar drain was placed   in the lumbar cistern was in a sterile fashion using a 14-gauge Tuohy   needle. This was then sutured into place and hooked up to a drainage   bag. It was used during the case for brain relaxation.      The patient was then placed supine on the operating table in modified   lawn chair position. Neck extended in the midline. She was fixed in   [place] 3-point head fixation. She was then registered for [facility]   stereotaxy, which was used throughout the case for localization. A strip   of hair was clipped, prepped and draped behind the hairline and a    bicoronal incision was made with a skin knife, carried down with Bovie   electrocautery through the scalp. The scalp and galea were elevated   anteriorly to the supraorbital region. The fat pads were brought up with   the scalp flap laterally to protect the frontalis branch of the facial nerve. I then dissected out a pericranial graft along the skull frontally as   a separate pedicle. This was in case we had to cranialize the frontal   sinus if we got into, which we did not. The scalp was reflected with   sutures and rubber bands. A high-speed drill was then used to perform a bifrontal   craniotomy elevating the bifrontal bone up over the sagittal sinus. This   was elevated without difficulty. There was no injury to the underlying   dura or sagittal sinus. I then drilled down the right supraorbital region   and also the orbital roof on the right-hand side in order to get   a smoother and better unobstructing view of the frontal floor and   planum sphenoidale region. The dura was then opened bilaterally in   triangular shaped fashion over the sagittal sinus in the midline. CSF   was let out for brain relaxation. I then ligated the anterior-most portion   of the sagittal sinus using 2-0 silk sutures, ligated this and I cut this as   well as the falx cerebri. I then placed a subfrontal retractor and gently   retracted the frontal lobe. I removed a little bit of right frontal tip in order   to gain access and better visualization.  The frontal lobe was elevated   towards the midline then as I got towards the olfactory groove, I   identified the right olfactory nerve running along the base of the skull   and pushed out laterally from tumor, identified the meningioma coming   up in the field and dissected away a plane. I was able to preserve the   right olfactory nerve, not the left. As I elevated the right frontal lobe further, I was able to dissect around   the tumor. I then brought the operating scope into the field and   cauterized the tumor and entered the tumor with an #11 blade knife. Pituitary was used to send frozen and permanent section. Permanent   section was thrown throughout the case. I then used the CUSA   cavitronic ultrasound to core out the tumor. As I cored out the   tumor, I circumferentially dissected the tumor, freeing away from   edematous white matter and the brain, developed arachnoid plane   laterally. Superficially, it was a little more stuck to the inferior portion of   the frontal lobe, but I was able to dissect especially on the right side. As I continued to dissect I dissected around the tumor laterally and up   over the top further removing large amounts of tumor. As I moved on   the right lateral aspect of the tumor and dissect in, I encountered the   right optic nerve, which was somewhat compressed from above,   but did have an arachnoid plane. I dissected this and then worked   medially. I peeled tumor off of the frontal floor. The tumor origin came   from the planum sphenoidale and there was bleeding from the base of   the tumor. I tried to amputate the tumor at the base of the skull as early   as possible to cut off the blood supply. I continually worked circumferentially around the tumor and then on the   left frontal lobe. I did not elevate the left frontal lobe per se, but worked   from the right side to the left, retracting the left frontal lobe more   laterally than rostrally. I developed a plane lateral to this. In this plane,   the olfactory nerve was stuck to the tumor and was injured.  I dissected   again circumferentially, got around the tumor and was able to identify   the left optic nerve, which was being impinged from the medial aspect   and superiorly from the tumor. I worked circumferentially and fully   removed the tumor and amputated its blood supply from the planum   sphenoidale. The tumor did go down into the anterior portion of the   sella above the diaphragm. This was all removed with CUSA and   bipolar was used to cauterize the skull base. I circumferentially worked around the optic nerves to amputate the   tumor. As I got through the posterior aspect of the tumor, I then was   able to deliver it more inferiorly. I encountered the A2 segments of the   anterior cerebral arteries. These were abutting the tumor, but were not   adherent to it. I  the anterior cerebral complexes from   the tumor and peeled the tumor away. I continued to work   circumferentially whittling down the tumor until the entire tumor was   removed. I then removed any remaining tumor that was visualized   along the skull base and cauterized the planum sphenoidale with   bipolar electrocautery. A gross total resection was performed. The wound was then copiously irrigated with antibiotic solution. Hemostasis was achieved. Surgicel was placed in the tumor   bed. Retractors were removed. The wound was irrigated out. The dura   was then closed primarily with interrupted and running 4-0 Nurolon   sutures. Circumferential epidural tackup sutures were placed. The   bone flap was reaffixed with the Sensicoreet craniofacial plating system. The gap in the frontal bone of the forehead was then filled in   performing a cranioplasty using 10 mL of HydroSet cement to fill in the   gaps cosmetically. The retractors were removed. A Hemovac drain   was placed. The scalp was then closed using inverted 2-0 Vicryl to   close the galea and running 3-0 Vicryl Rapide on the skin. The wounds   were clean, dried, dressed with sterile dressing.  The patient was then   taken out of [facility], extubated and taken to recovery room in   stable condition. MD ANA Garcia / Urszula.Kaveh   D:  10/11/2017   15:04   T:  10/11/2017   16:39   Job #:  [id number]

## 2017-10-11 NOTE — PROGRESS NOTES
Neurosurgery Progress Note  [facility]  [phone]        Admit Date: 10/9/2017   LOS: 2 days        Daily Progress Note: 10/11/2017    POD:2 Day Post-Op    S/P: Procedure(s):  BRAIN LAB GUIDED BIFRONTAL CRANIOTOMY WITH RESECTION OF ANTERIOR SKULL BASE MENINGIOMA , LUMBAR DRAIN PLACEMENT    HPI: The patient developed vision loss in her left eye over the past few months. She saw Dr. Ida Portillo of neuro-opthalmology who sent her for an MRI which revealed a 3 cm planum sphenoid meningioma. After discussion with Dr. Adina Cook, it was decided to pursue surgical resection of the tumor, so she underwent a bifrontal craniotomy with resection of the anterior skull base meningioma. Subjective:   No acute events overnight. Less nausea. Vision stable.  at bedside. Denies numbness, tingling, chest pain, leg pain,  difficulty swallowing, and dyspnea.        Objective:     Vital signs  Temp (24hrs), Av.5 °F (36.9 °C), Min:98.3 °F (36.8 °C), Max:99.1 °F (37.3 °C)   10/11 0701 - 10/11 1900  In: -   Out: 580 [Urine:500; Drains:80]  10/09 1901 - 10/11 0700  In: 3428.3 [I.V.:3428.3]  Out: 5080 [Urine:4290; Drains:640]    Visit Vitals    BP 96/63    Pulse 75    Temp 98.3 °F (36.8 °C)    Resp 16    Ht 5' 2\" (1.575 m)    Wt 72 kg (158 lb 11.7 oz)    LMP 2017 (Approximate)    SpO2 100%    BMI 29.03 kg/m2    O2 Flow Rate (L/min): 2 l/min O2 Device: Room air     Pain control  Pain Assessment  Pain Scale 1: Numeric (0 - 10)  Pain Intensity 1: 0  Pain Onset 1: acute  Pain Location 1: Head  Pain Orientation 1: Anterior  Pain Description 1: Pressure  Pain Intervention(s) 1: Medication (see MAR)    PT/OT  Gait     Gait  Base of Support: Narrowed  Speed/Carlee: Slow, Shuffled  Step Length: Right shortened, Left shortened  Gait Abnormalities: Decreased step clearance, Scissoring, Path deviations, Trunk sway increased, Shuffling gait  Ambulation - Level of Assistance: Minimal assistance, Assist x2  Distance (ft): 75 Feet (ft)  Assistive Device: Gait belt (B HHA)           Physical Exam:  Gen:NAD. Neuro: A&Ox3. Follows commands. Speech clear. Affect normal  PERRL. EOMI. Face symmetric. Tongue midline. IVORY. Strength 5/5 in UE and LE BL. Negative drift. Gait deferred. Skin: Inicision bifrontal area C/D/I with sutures in place  Heart RRR  Lungs CTA BL  Abd soft, NT. Hypoactive bowel sounds  Ext no edema    24 hour results:    No results found for this or any previous visit (from the past 24 hour(s)). Assessment:     Active Problems:    Meningioma ([facility] [address] (10/9/2017)        Plan:   1. Meningioma, anterior skull base   - s/p crani 10/09   - Normalize and mobilize   - Cont decadron - weaning   - HVAC removed   - PT/OT evals   - Transfer to NSTU  2. Cerebral edema and brain compression   - due to #1   - plans as above  3. Hypothyroidism   - Cont levothyroxine from home    Activity: up with assist  DVT ppx: SCDs  Dispo: tbd    Plan d/w Dr. Brady Day.       Isaias Dawn, AVINASH

## 2017-10-11 NOTE — ROUTINE PROCESS
TRANSFER - OUT REPORT:    Verbal report given to Chandana Goins RN(name) on [facility] [place] [facility] [place]  being transferred to NSTU (unit) for routine progression of care       Report consisted of patients Situation, Background, Assessment and   Recommendations(SBAR). Information from the following report(s) SBAR, Kardex, ED Summary, Procedure Summary, Intake/Output, MAR, Accordion and Recent Results was reviewed with the receiving nurse. Lines:   Peripheral IV 10/09/17 Right Hand (Active)   Site Assessment Clean, dry, & intact 10/11/2017 12:00 PM   Phlebitis Assessment 0 10/11/2017 12:00 PM   Infiltration Assessment 0 10/11/2017 12:00 PM   Dressing Status Clean, dry, & intact 10/11/2017 12:00 PM   Dressing Type Transparent 10/11/2017 12:00 PM   Hub Color/Line Status Capped 10/11/2017 12:00 PM   Action Taken Open ports on tubing capped 10/11/2017 12:00 PM   Alcohol Cap Used Yes 10/11/2017 12:00 PM       Peripheral IV 10/09/17 Left Wrist (Active)   Site Assessment Clean, dry, & intact 10/11/2017 12:00 PM   Phlebitis Assessment 0 10/11/2017 12:00 PM   Infiltration Assessment 0 10/11/2017 12:00 PM   Dressing Status Clean, dry, & intact 10/11/2017 12:00 PM   Dressing Type Transparent 10/11/2017 12:00 PM   Hub Color/Line Status Capped 10/11/2017 12:00 PM   Action Taken Open ports on tubing capped 10/11/2017 12:00 PM   Alcohol Cap Used Yes 10/11/2017 12:00 PM        Opportunity for questions and clarification was provided.       Patient transported with:   Monitor  Registered Nurse  Tech

## 2017-10-11 NOTE — PROGRESS NOTES
Care Management: noted PT and OT eval. PT suggesting Home Health with PT and OT suggesting in patient rehab. Noted NP note: placement to be determined. To follow transition of care.   Candido Wang RN BSN CM

## 2017-10-11 NOTE — PROGRESS NOTES
Problem: Mobility Impaired (Adult and Pediatric)  Goal: *Acute Goals and Plan of Care (Insert Text)  Physical Therapy Goals  Initiated 10/10/2017  1. Patient will move from supine to sit and sit to supine and scoot up and down in bed with modified independence within 7 day(s). 2. Patient will transfer from bed to chair and chair to bed with modified independence using the least restrictive device within 7 day(s). 3. Patient will perform sit to stand with modified independence within 7 day(s). 4. Patient will ambulate with modified independence for 300 feet with the least restrictive device within 7 day(s). 5. Patient will ascend/descend 10 stairs with handrail(s) with modified independence within 7 day(s). 6. Patient will improve Samaniego Balance score by 7 points within 7 days. PHYSICAL THERAPY TREATMENT  Patient: Martita Robb (43 y.o. female)  Date: 10/11/2017  Diagnosis:  MENINGOMA   Meningioma ([address] <principal problem not specified>  Procedure(s) (LRB):  BRAIN LAB GUIDED BIFRONTAL CRANIOTOMY WITH RESECTION OF ANTERIOR SKULL BASE MENINGIOMA , LUMBAR DRAIN PLACEMENT (N/A) 2 Days Post-Op  Precautions: Fall (Hemovac)      ASSESSMENT:  Cleared for mobilization by RN, hemovac removed. Pt appears much more alert today, reports continued improvements in vision, no nausea today. Gait training progressed to approx 400ft in hallway with CGA to SBA with emphasis on integration of lateral head movements for environmental scanning, one minor LOB with dual task integration but able to recover without assist. No major focal neuro deficit appreciated, will continue to progress during acute admission. Remained up in chair at end of session and set up for lunch, VSS.  Anticipating pt will be appropriate for discharge home with family assist.      Progression toward goals:  [X]    Improving appropriately and progressing toward goals  [ ]    Improving slowly and progressing toward goals  [ ]    Not making progress toward goals and plan of care will be adjusted       PLAN:  Patient continues to benefit from skilled intervention to address the above impairments. Continue treatment per established plan of care. Discharge Recommendations:  None  Further Equipment Recommendations for Discharge:  TBD - none anticipated       SUBJECTIVE:   Patient stated I'm feeling better.       OBJECTIVE DATA SUMMARY:   Critical Behavior:  Neurologic State: Alert, Eyes open spontaneously, Eyes open to voice  Orientation Level: Oriented X4  Cognition: Appropriate decision making, Appropriate safety awareness, Follows commands  Safety/Judgement: Awareness of environment, Good awareness of safety precautions  Functional Mobility Training:     Transfers:  Sit to Stand: Contact guard assistance;Stand-by asssistance  Stand to Sit: Stand-by asssistance     Balance:  Sitting: Intact  Standing: Impaired; Without support  Standing - Static: Good  Standing - Dynamic : Good  Ambulation/Gait Training:  Distance (ft): 400 Feet (ft)  Assistive Device: Gait belt  Ambulation - Level of Assistance: Contact guard assistance;Stand-by asssistance  Gait Abnormalities: Altered arm swing;Decreased step clearance  Base of Support: Narrowed  Speed/Carlee: Slow  Step Length: Right shortened;Left shortened                    Pain:  Pain Scale 1: Numeric (0 - 10)  Pain Intensity 1: 0     Activity Tolerance:   VSS      Please refer to the flowsheet for vital signs taken during this treatment.   After treatment:   [X]    Patient left in no apparent distress sitting up in chair  [ ]    Patient left in no apparent distress in bed  [X]    Call bell left within reach  [X]    Nursing notified  [ ]    Caregiver present  [ ]    Bed alarm activated      COMMUNICATION/COLLABORATION:   The patients plan of care was discussed with: Occupational Therapist and Registered Nurse     Rowan DALI Westbrook DPT   Time Calculation: 15 mins

## 2017-10-11 NOTE — PROGRESS NOTES
10/10/17 1930: Bedside shift change report given to Edwin Khan RN (oncoming nurse) by Kalyn Parekh RN (offgoing nurse). Report included the following information SBAR, Kardex, Procedure Summary, Intake/Output, MAR, Accordion, Recent Results, Cardiac Rhythm NSR and Alarm Parameters . SHIFT SUMMARY: Patient remains neuro intact, VSS, adequate urine output. No n/v, relatively uneventful shift. Problem: Falls - Risk of  Goal: *Absence of Falls  Document Qamar Fall Risk and appropriate interventions in the flowsheet.    Outcome: Progressing Towards Goal  Fall Risk Interventions:  Mobility Interventions: Communicate number of staff needed for ambulation/transfer, Bed/chair exit alarm, Patient to call before getting OOB     Mentation Interventions: Adequate sleep, hydration, pain control, Door open when patient unattended, Evaluate medications/consider consulting pharmacy, More frequent rounding, Toileting rounds, Update white board     Medication Interventions: Patient to call before getting OOB, Teach patient to arise slowly     Elimination Interventions: Call light in reach, Elevated toilet seat, Patient to call for help with toileting needs, Toilet paper/wipes in reach, Toileting schedule/hourly rounds

## 2017-10-12 PROCEDURE — 74011250637 HC RX REV CODE- 250/637: Performed by: NURSE PRACTITIONER

## 2017-10-12 PROCEDURE — 74011250637 HC RX REV CODE- 250/637: Performed by: NEUROLOGICAL SURGERY

## 2017-10-12 PROCEDURE — 97116 GAIT TRAINING THERAPY: CPT

## 2017-10-12 PROCEDURE — 65660000000 HC RM CCU STEPDOWN

## 2017-10-12 PROCEDURE — 74011250636 HC RX REV CODE- 250/636: Performed by: NEUROLOGICAL SURGERY

## 2017-10-12 PROCEDURE — 74011000250 HC RX REV CODE- 250: Performed by: NURSE PRACTITIONER

## 2017-10-12 RX ORDER — FAMOTIDINE 20 MG/1
20 TABLET, FILM COATED ORAL EVERY 12 HOURS
Status: DISCONTINUED | OUTPATIENT
Start: 2017-10-12 | End: 2017-10-13 | Stop reason: HOSPADM

## 2017-10-12 RX ORDER — POLYVINYL ALCOHOL 14 MG/ML
1 SOLUTION/ DROPS OPHTHALMIC AS NEEDED
Status: DISCONTINUED | OUTPATIENT
Start: 2017-10-12 | End: 2017-10-13 | Stop reason: HOSPADM

## 2017-10-12 RX ORDER — LEVETIRACETAM 500 MG/1
500 TABLET ORAL EVERY 12 HOURS
Status: DISCONTINUED | OUTPATIENT
Start: 2017-10-12 | End: 2017-10-13 | Stop reason: HOSPADM

## 2017-10-12 RX ADMIN — Medication 10 ML: at 14:45

## 2017-10-12 RX ADMIN — FAMOTIDINE 20 MG: 20 TABLET ORAL at 09:08

## 2017-10-12 RX ADMIN — LEVOTHYROXINE SODIUM 100 MCG: 100 TABLET ORAL at 06:42

## 2017-10-12 RX ADMIN — LEVETIRACETAM 500 MG: 500 TABLET, FILM COATED ORAL at 09:08

## 2017-10-12 RX ADMIN — DEXAMETHASONE 4 MG: 4 TABLET ORAL at 21:23

## 2017-10-12 RX ADMIN — FAMOTIDINE 20 MG: 20 TABLET ORAL at 21:23

## 2017-10-12 RX ADMIN — POLYVINYL ALCOHOL 1 DROP: 14 SOLUTION/ DROPS OPHTHALMIC at 09:09

## 2017-10-12 RX ADMIN — Medication 10 ML: at 06:42

## 2017-10-12 RX ADMIN — DEXAMETHASONE 4 MG: 4 TABLET ORAL at 06:42

## 2017-10-12 RX ADMIN — LEVETIRACETAM 500 MG: 500 TABLET, FILM COATED ORAL at 21:23

## 2017-10-12 RX ADMIN — Medication 10 ML: at 21:23

## 2017-10-12 RX ADMIN — DOCUSATE SODIUM 100 MG: 100 CAPSULE, LIQUID FILLED ORAL at 09:08

## 2017-10-12 RX ADMIN — DEXAMETHASONE 4 MG: 4 TABLET ORAL at 14:45

## 2017-10-12 NOTE — PROGRESS NOTES
Bedside and Verbal shift change report given to Alfred Claire RN (oncoming nurse) by Maddie Hendrickson RN (offgoing nurse). Report included the following information SBAR, Kardex, Intake/Output, MAR, Recent Results and Cardiac Rhythm NSR.

## 2017-10-12 NOTE — PROGRESS NOTES
CM follow up. CM noted that PT had no recommendations. CM met with patient and her  who said that patient is ambulating inependently with  present. Patient was somewhat lethargic but otherwise well oriented. No discharge planning needs presented at this time.

## 2017-10-12 NOTE — PROGRESS NOTES
Bedside shift change report given to Laura Conteh (oncoming nurse) by Ruben Bynum (offgoing nurse).  Report included the following information SBAR, Kardex, Procedure Summary, Intake/Output, MAR, Recent Results and Cardiac Rhythm SR.

## 2017-10-12 NOTE — PROGRESS NOTES
Neurosurgery Progress Note  Angelica Palacio, [facility]  [phone]        Admit Date: 10/9/2017   LOS: 3 days        Daily Progress Note: 10/12/2017    POD:3 Day Post-Op    S/P: Procedure(s):  BRAIN LAB GUIDED BIFRONTAL CRANIOTOMY WITH RESECTION OF ANTERIOR SKULL BASE MENINGIOMA , LUMBAR DRAIN PLACEMENT    HPI: The patient developed vision loss in her left eye over the past few months. She saw Dr. Ely Joyce of neuro-opthalmology who sent her for an MRI which revealed a 3 cm planum sphenoid meningioma. After discussion with Dr. Oleg Perez, it was decided to pursue surgical resection of the tumor, so she underwent a bifrontal craniotomy with resection of the anterior skull base meningioma. Subjective:   No acute events overnight. Pt transferred to NSTU yesterday. She states she has walked around the unit 4 times today. Nausea has improved. Denies numbness, tingling, chest pain, leg pain, difficulty swallowing, and dyspnea. Objective:     Vital signs  Temp (24hrs), Av.5 °F (36.9 °C), Min:97.8 °F (36.6 °C), Max:98.9 °F (37.2 °C)      10/10 1901 - 10/12 0700  In: 975 [P.O.:200; I.V.:775]  Out: 3740 [Urine:3480; Drains:260]    Visit Vitals    BP 93/52 (BP 1 Location: Right arm, BP Patient Position: Sitting; At rest)    Pulse 67    Temp 98.9 °F (37.2 °C)    Resp 13    Ht 5' 2\" (1.575 m)    Wt 72.9 kg (160 lb 11.5 oz)    LMP 2017 (Approximate)    SpO2 100%    BMI 29.4 kg/m2    O2 Flow Rate (L/min): 2 l/min O2 Device: Room air     Pain control  Pain Assessment  Pain Scale 1: Numeric (0 - 10)  Pain Intensity 1: 0  Pain Onset 1: acute  Pain Location 1: Abdomen  Pain Orientation 1: Mid  Pain Description 1: Intermittent  Pain Intervention(s) 1: Medication (see MAR)    PT/OT  Gait     Gait  Base of Support: Narrowed  Speed/Carlee: Pace decreased (<100 feet/min)  Step Length: Left shortened, Right shortened  Gait Abnormalities: Altered arm swing  Ambulation - Level of Assistance: Supervision  Distance (ft): 600 Feet (ft)  Assistive Device: Gait belt  Rail Use: Left   Stairs - Level of Assistance: Contact guard assistance  Number of Stairs Trained: 10           Physical Exam:  Gen:NAD. Neuro: A&Ox3. Follows commands. Speech clear. Affect normal  PERRL. EOMI. Face symmetric. Tongue midline. IVORY. Strength 5/5 in UE and LE BL. Negative drift. Gait deferred. Skin: Inicision bifrontal area C/D/I with sutures in place      24 hour results:    No results found for this or any previous visit (from the past 24 hour(s)). Assessment:     Active Problems:    Meningioma ([facility] [address] (10/9/2017)        Plan:   1. Meningioma, anterior skull base   - s/p crani 10/09   - Normalize and mobilize   - Cont decadron - weaning   - HVAC removed   - PT/OT evals   -  Likely home in am   - encourage incentive spirometer  2. Cerebral edema and brain compression   - due to #1   - plans as above  3. Hypothyroidism   - Cont levothyroxine from home    Activity: up with assist  DVT ppx: SCDs  Dispo: home in am    Plan d/w Dr. Denzel Nieves.       Alyce Chin NP

## 2017-10-12 NOTE — INTERDISCIPLINARY ROUNDS
IDR/SLIDR Summary          Patient: Darylene Richard MRN: [id number]    Age: 38 y.o. YOB: 1975 Room/Bed: [facility]   Admit Diagnosis:  MENINGOMA   Meningioma (HCC)  Principal Diagnosis: <principal problem not specified>   Goals: Safety, Abx, Discharge planning  Readmission: NO  Quality Measure: Not applicable  VTE Prophylaxis: Mechanical  Influenza Vaccine screening completed? YES  Pneumococcal Vaccine screening completed? YES  Mobility needs: No   Nutrition plan:Yes  Consults:P.T, O.T. and Case Management    Financial concerns:No  Escalated to CM? NO  RRAT Score: 3   Interventions:{Intervention:94347}  Testing due for pt today?  YES  LOS: 3 days Expected length of stay *** days  Discharge plan: Home   PCP: Luisito Flor MD  Transportation needs: No    Days before discharge:one day until discharge   Discharge disposition: Home    Signed:     Greg Arauz  10/12/2017  1:42 AM

## 2017-10-12 NOTE — PROGRESS NOTES
Problem: Mobility Impaired (Adult and Pediatric)  Goal: *Acute Goals and Plan of Care (Insert Text)  Physical Therapy Goals  Initiated 10/10/2017  1. Patient will move from supine to sit and sit to supine and scoot up and down in bed with modified independence within 7 day(s). 2. Patient will transfer from bed to chair and chair to bed with modified independence using the least restrictive device within 7 day(s). 3. Patient will perform sit to stand with modified independence within 7 day(s). 4. Patient will ambulate with modified independence for 300 feet with the least restrictive device within 7 day(s). 5. Patient will ascend/descend 10 stairs with handrail(s) with modified independence within 7 day(s). 6. Patient will improve Samaniego Balance score by 7 points within 7 days. PHYSICAL THERAPY TREATMENT  Patient: Antoine Govea (43 y.o. female)  Date: 10/12/2017  Diagnosis:  MENINGOMA   Meningioma ([facility] [address] <principal problem not specified>  Procedure(s) (LRB):  BRAIN LAB GUIDED BIFRONTAL CRANIOTOMY WITH RESECTION OF ANTERIOR SKULL BASE MENINGIOMA , LUMBAR DRAIN PLACEMENT (N/A) 3 Days Post-Op  Precautions: Fall ([facility])  Chart, physical therapy assessment, plan of care and goals were reviewed. ASSESSMENT:  Pt was able to increase gait tolerance and perform steps. Pt continues to report feeling better. Pt is mobilizing at  Supervision level. Pt has a supported . Discussed with pt,  and nursing that she can ambulate in the hallway with . Will follow 1-2 more time for consistency and carryover then discharge. Progression toward goals:  [X]      Improving appropriately and progressing toward goals  [ ]      Improving slowly and progressing toward goals  [ ]      Not making progress toward goals and plan of care will be adjusted       PLAN:  Patient continues to benefit from skilled intervention to address the above impairments. Continue treatment per established plan of care.   Discharge Recommendations: none  Further Equipment Recommendations for Discharge:  \none       SUBJECTIVE:   Patient stated *I am better      OBJECTIVE DATA SUMMARY:   Critical Behavior:  Neurologic State: Eyes open spontaneously, Drowsy, Alert  Orientation Level: Oriented X4, Appropriate for age  Cognition: Appropriate decision making, Appropriate for age attention/concentration, Appropriate safety awareness, Follows commands, Recognition of people/places  Safety/Judgement: Awareness of environment, Insight into deficits, Home safety, Good awareness of safety precautions  Functional Mobility Training:  Bed Mobility:     Supine to Sit: Supervision                          Transfers:  Sit to Stand: Supervision  Stand to Sit: Supervision                             Balance:  Sitting: Intact  Standing: Intact  Ambulation/Gait Training:  Distance (ft): 600 Feet (ft)  Assistive Device: Gait belt  Ambulation - Level of Assistance: Supervision        Gait Abnormalities: Altered arm swing              Speed/Carlee: Pace decreased (<100 feet/min)  Step Length: Left shortened;Right shortened                    Stairs:  Number of Stairs Trained: 10  Stairs - Level of Assistance: Contact guard assistance  Rail Use: Left      Neuro Re-Education:     Therapeutic Exercises:      Pain:  Pain Scale 1: Numeric (0 - 10)  Pain Intensity 1: 0              Activity Tolerance:   Limited   Please refer to the flowsheet for vital signs taken during this treatment.   After treatment:   [X] Patient left in no apparent distress sitting up in chair  [ ] Patient left in no apparent distress in bed  [X] Call bell left within reach  [X] Nursing notified  [X] Caregiver present  [ ] Bed alarm activated      COMMUNICATION/COLLABORATION:   The patients plan of care was discussed with: Registered Nurse     Reynaldo Perez PTA   Time Calculation: 18 mins

## 2017-10-12 NOTE — PROGRESS NOTES
Problem: Falls - Risk of  Goal: *Absence of Falls  Document Qamar Fall Risk and appropriate interventions in the flowsheet.    Outcome: Progressing Towards Goal  Fall Risk Interventions:  Mobility Interventions: Communicate number of staff needed for ambulation/transfer, OT consult for ADLs, Patient to call before getting OOB, PT Consult for mobility concerns, PT Consult for assist device competence     Mentation Interventions: Family/sitter at bedside, Increase mobility, More frequent rounding, Room close to nurse's station, Toileting rounds     Medication Interventions: Patient to call before getting OOB, Teach patient to arise slowly     Elimination Interventions: Call light in reach, Patient to call for help with toileting needs, Toileting schedule/hourly rounds

## 2017-10-12 NOTE — ROUTINE PROCESS
Bedside and Verbal shift change report given to [place] (oncoming nurse) by Joss Monaco (offgoing nurse). Report included the following information SBAR, Kardex, Procedure Summary, Intake/Output, MAR, Accordion, Recent Results, Med Rec Status and Cardiac Rhythm NSR.

## 2017-10-12 NOTE — PROGRESS NOTES
POD 3  C/o dry eyes - using drops bilaterally with improvement  afeb VSS  Alert, oriented, fluent speech  Incision without erythema, edema or drainage  IVORY  S/p crani and resection meningioma  Doing well  Home tomorrow if stable

## 2017-10-13 VITALS
HEART RATE: 74 BPM | BODY MASS INDEX: 29.49 KG/M2 | DIASTOLIC BLOOD PRESSURE: 64 MMHG | SYSTOLIC BLOOD PRESSURE: 102 MMHG | OXYGEN SATURATION: 99 % | TEMPERATURE: 98.4 F | HEIGHT: 62 IN | WEIGHT: 160.27 LBS | RESPIRATION RATE: 16 BRPM

## 2017-10-13 LAB
HGB BLD-MCNC: 12.3 G/DL (ref 11.5–16)
HGB BLD-MCNC: 12.6 G/DL (ref 11.5–16)

## 2017-10-13 PROCEDURE — 74011250636 HC RX REV CODE- 250/636: Performed by: NEUROLOGICAL SURGERY

## 2017-10-13 PROCEDURE — 74011250637 HC RX REV CODE- 250/637: Performed by: NEUROLOGICAL SURGERY

## 2017-10-13 PROCEDURE — 74011250637 HC RX REV CODE- 250/637: Performed by: NURSE PRACTITIONER

## 2017-10-13 RX ORDER — LEVETIRACETAM 500 MG/1
500 TABLET ORAL EVERY 12 HOURS
Qty: 60 TAB | Refills: 0 | Status: SHIPPED | OUTPATIENT
Start: 2017-10-13

## 2017-10-13 RX ORDER — DEXAMETHASONE 2 MG/1
TABLET ORAL
Qty: 18 TAB | Refills: 0 | Status: SHIPPED | OUTPATIENT
Start: 2017-10-13

## 2017-10-13 RX ORDER — OXYCODONE AND ACETAMINOPHEN 5; 325 MG/1; MG/1
1-2 TABLET ORAL
Qty: 22 TAB | Refills: 0 | Status: SHIPPED | OUTPATIENT
Start: 2017-10-13

## 2017-10-13 RX ORDER — DOCUSATE SODIUM 100 MG/1
CAPSULE, LIQUID FILLED ORAL
Qty: 14 CAP | Refills: 0 | Status: SHIPPED | OUTPATIENT
Start: 2017-10-13

## 2017-10-13 RX ADMIN — DEXAMETHASONE 4 MG: 4 TABLET ORAL at 06:39

## 2017-10-13 RX ADMIN — Medication 10 ML: at 06:00

## 2017-10-13 RX ADMIN — LEVETIRACETAM 500 MG: 500 TABLET, FILM COATED ORAL at 08:10

## 2017-10-13 RX ADMIN — FAMOTIDINE 20 MG: 20 TABLET ORAL at 08:10

## 2017-10-13 RX ADMIN — DOCUSATE SODIUM 100 MG: 100 CAPSULE, LIQUID FILLED ORAL at 08:10

## 2017-10-13 RX ADMIN — LEVOTHYROXINE SODIUM 100 MCG: 100 TABLET ORAL at 06:39

## 2017-10-13 NOTE — DISCHARGE INSTRUCTIONS
After Hospital Care Plan:  Discharge Instructions Craniotomy  [facility]    Patient Name: Marlena Vega    Date of procedure: 10/9/2017  Date of discharge: 10/13/2017    Procedure: Procedure(s):  BRAIN LAB GUIDED BIFRONTAL CRANIOTOMY WITH RESECTION OF ANTERIOR SKULL BASE MENINGIOMA , LUMBAR DRAIN PLACEMENT  PCP: Silva Tran MD    Follow up appointments  Follow up with your neurosurgeon in 10-14 days. Call [phone] to make an appointment as soon as you get home from the hospital.  Follow-up care is a key part of your treatment and safety. Be sure to make and go to all appointments, and call your doctor if you are having problems. It's also a good idea to know your test results and keep a list of the medicines you take. A craniotomy is surgery to open your skull to fix a problem in your brain. It can be done for many reasons. For example, you may need a craniotomy if your brain or blood vessels are damaged or if you have a tumor or an infection in your brain. You will probably feel very tired for several weeks after surgery. You may also have headaches or problems concentrating. It can take 4 to 8 weeks to recover from surgery. Your cuts (incisions) may be sore for about 5 days after surgery. You may also have numbness and shooting pains near your wound, or swelling and bruising around your eyes. As your wound starts to heal, it may begin to itch. Medicines and ice packs can help with headaches, pain, swelling, and itching. The stitches that hold your incisions together may go away on their own or will be removed in 7 to 10 days. This depends on the type of stitches the doctor uses. Staples are usually removed in 10-14 days. It is common for your scalp to swell with fluid. After the swelling goes down, you may have a dent in your head. Some kinds of plates stay attached to hold the skull flap to your head.  If your head was shaved, you may wear clean hats or scarves on your head until your hair grows back. This care sheet gives you a general idea about how long it will take for you to recover. But each person recovers at a different pace. Follow the steps below to get better as quickly as possible. When to call your Neurosurgeon:   You have trouble thinking clearly.  You are sleeping more than you are awake.  You have a fever with a stiff neck or a severe headache.  You have any sudden vision changes.  Nausea or vomiting, severe headache.  Loss of bowel or bladder function, inability to urinate.  Increased weakness-greater than before your surgery.  Severe pain or pain not relieved by medications.  Signs of a blood clot in your leg-calf pain, tenderness, redness, swelling of lower leg.  Signs of infection-if your incision is red; continues to have drainage; drainage has a foul odor or if you have a persistent fever over 101 degrees for 24 hours.  You fall and hit your head. When to call your Primary Care Physician:   Concerns about medical conditions such as diabetes, high blood pressure, asthma, congestive heart failure.  Call if blood sugars are elevated, persistent headache or dizziness, coughing or congestion, constipation or diarrhea, burning with urination, abnormal heart rate. When to call 911 and go to the nearest emergency room:   Acute onset of chest pain, shortness of breath, difficulty breathing   You passed out (lost consciousness).  It is hard to think, move, speak, or see.  Your body is jerking or shaking. Activity   Rest when you feel tired. It is normal to want to sleep during the day. It is a good idea to plan to take a nap every day. Getting enough sleep will help you recover.  Try not to lie flat when you rest or sleep. You can use a wedge pillow, or you can put a rolled towel or foam padding under your pillow. You can also raise the head of your bed by putting bricks or wooden blocks under the bed legs.    After lying down, bring your head up slowly. This can prevent headaches or dizziness.  Try to walk each day. Start by walking a little more than you did the day before. Bit by bit, increase the amount you walk. Walking boosts blood flow and helps prevent pneumonia and constipation.  Avoid heavy lifting until your doctor says it is okay.  Do not drive for 2 to 3 weeks or until your doctor says it is okay. When you begin driving again, start with short, familiar routes in the daytime.  Ask your doctor if it is safe for you to travel by plane.  Avoid risky activities, such as climbing a ladder, for 3 months after surgery.  Avoid strenuous activities, such as bicycle riding, jogging, weight lifting, or aerobic exercise, for 3 months or until your doctor says it is okay.  Do not play any rough or contact sports for 3 months or until your doctor says it is okay.  You may engage in sexual activity. Diet   Resume usual diet; drink plenty of fluids; eat foods high in fiber   It is important to have regular bowel movements. Pain medications may cause constipation. You may want to take a stool softener (such as Senokot-S or Colace) to prevent constipation.  If constipation occurs, take a laxative (such as Dulcolax tablets, Milk of Magnesia, or a suppository). Laxatives should only be used if the above preventable measures have failed and you still have not had a bowel movement after three days   Try to avoid constipation and straining with bowel movements. Incision Care      You can wash your hair 7 days after your surgery. But do not soak your head or swim for 2 to 3 weeks. The sutures will dissolve on there own. This usually takes about 2 weeks.  Do not dye or color your hair for 4 weeks after your surgery.  Do not rub or apply any lotions or ointments to your incision site.  Do not scrub your wound    Medicines   If your doctor or nurse practitioner prescribed antibiotics, take them as directed.  Do not stop taking them just because you feel better. You need to take the full course of antibiotics.  If you get medicines to prevent seizures, take them exactly as directed.  If the doctor or nurse practitioner gave you a prescription medicine for pain, take it as prescribed.  If you are not taking a prescription pain medicine, ask your doctor or nurse practitioner if you can take an over-the-counter medicine.    Pain Medication Safety  DO:   Read the Medication Guide    Take your medicine exactly as prescribed    Store your medicine away from children and in a safe place    Call your healthcare provider for medical advice about side effects. You may report side effects to FDA at 1-633-FDA-9437.  Please be aware that many medications contain Tylenol. We do not want you to over medicate so please read the information below as a guide. Do not take more than 4 Grams of Tylenol in a 24 hour period if you are under age 79 or 3 Grams in 24 hours if you are over 79years old. (There are 1000 milligrams in one Gram)  o Percocet contains 325 mg of Tylenol per tablet (do not take more than 12 tablets in 24 hours)  o Lortab contains 500 mg of Tylenol per tablet (do not take more than 8 tablets in 24 hours)  o Norco contains 325 mg of Tylenol per tablet (do not take more than 12 tablets in 24 hours). DO NOT:   Do not give your medicine to others.  Do not take medicine unless it was prescribed for you.  Do not stop taking your medicine without talking to your healthcare provider.  Do not break, chew, crush, dissolve, or inject your medicine. If you cannot swallow your medicine whole, talk to your healthcare provider.  Do not drink alcohol while taking this medicine.  Do not take anti-inflammatory medications or aspirin unless instructed by your physician. You have been given prescriptions for the following medications:  1. Dexamethasone 2 mg taper - this is a steroid.  It helps with swelling in the brain. Take exactly as prescribed until finished. 2. Keppra 500 mg twice a day - this is an anti-seizure medication. It is taken to help prevent seizures from occurring. Continue to take this medication until Dr. Juliette Morton says to stop it. Please contact his office for refills if needed. 3. Docusate 100 mg twice a day - this medication is a stool softener. Take this medication while on pain medication as those medications are constipating. 4. Percocet 5/325 mg every 4 hours as needed - this is a pain medication. Take it only as needed.  Do not drive while taking this medication

## 2017-10-13 NOTE — DISCHARGE SUMMARY
Discharge Summary     Patient ID:  Natalio Gardner  [id number]   38 y.o.  1975    Admit date: 10/9/2017    Discharge Date: 10/13/2017      Admitting Physician: Jamin Murcia MD     Discharge Physician: Pedro Ash NP    Admission Diagnoses:  MENINGOMA   Meningioma [facility])    Last Procedure: Procedure(s):  BRAIN LAB GUIDED BIFRONTAL CRANIOTOMY WITH RESECTION OF [address] , [address] PLACEMENT    Discharge Diagnoses: Active Problems:    Meningioma ([address] (10/9/2017)         Consults:  1. None    Significant Diagnostic Studies:  1. 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 2. CT head without contrast on 10/10/17 shows status post frontal craniotomy with resection of anterior cranial fossa meningioma. Typical postprocedural findings. Patient condition upon discharge: Stable    Hospital Course: The patient developed vision loss in her left eye over the past few months. She saw Dr. Berta Abraham of neuro-opthalmology who sent her for an MRI which revealed a 3 cm planum sphenoid meningioma. After discussion with Dr. Radha Dinh, it was decided to pursue surgical resection of the tumor, so she underwent a bifrontal craniotomy with resection of the anterior skull base meningioma. The pathology was consistent with a WHO grade 1 meningioma. The intraoperative findings can be found in Dr. Mariposa Riley operative report. Post-operatively, the patient did well. She reports that her vision is vastly improved. She no longer has the blurry vision or blind spots in her left eye that were present pre-op. She was transferred to the ICU from the recovery room. On POD1, nausea and vomiting were an issue. This improved on POD2. She was afebrile and her vital signs were stable. She was transferred to [facility] on POD2.  Her HVAC was removed on POD2. She worked with PT and OT and was found to have no discharge needs for home. She was taking PO well, voiding on her own, and ambulating without assistance. She is ready for discharge to home on POD4. Discharge instructions were discussed with the patient and her . She will follow-up with Dr. Brady Day in 2 weeks. Disposition: Home    Patient Instructions:   Current Discharge Medication List      START taking these medications    Details   docusate sodium (COLACE) 100 mg capsule Take 1 capsule PO bid while on pain medication  Qty: 14 Cap, Refills: 0      levETIRAcetam (KEPPRA) 500 mg tablet Take 1 Tab by mouth every twelve (12) hours. Qty: 60 Tab, Refills: 0      oxyCODONE-acetaminophen (PERCOCET) 5-325 mg per tablet Take 1-2 Tabs by mouth every four (4) hours as needed. Max Daily Amount: 12 Tabs. Qty: 22 Tab, Refills: 0         CONTINUE these medications which have CHANGED    Details   dexamethasone (DECADRON) 2 mg tablet Take 1 tab PO every 8 hours x 3 days then 1 tab PO every 12 hours x 3 days then 1 tab PO daily x 3 days then stop. Qty: 18 Tab, Refills: 0         CONTINUE these medications which have NOT CHANGED    Details   levothyroxine (SYNTHROID) 100 mcg tablet Take 100 mcg by mouth Daily (before breakfast). ergocalciferol (VITAMIN D2) 50,000 unit capsule Take 50,000 Units by mouth every seven (7) days. PATIENT TAKES MED ON SATURDAYS.      multivitamin (ONE A DAY) tablet Take 1 Tab by mouth daily. acetaminophen (TYLENOL) 325 mg tablet Take 650 mg by mouth every four (4) hours as needed for Pain. Diet: Reference my discharge instructions. Activity: Reference my discharge instructions. EXAM:   Gen:NAD. Neuro: A&Ox3. Follows commands. Speech clear. Affect normal  PERRL. EOMI. Face symmetric. Tongue midline. IVORY. Strength 5/5 in UE and LE BL. Negative drift. Gait deferred.   Skin: Inicision bifrontal area C/D/I with sutures in place  Heart RRR  Lungs CTA BL  Abd soft, NT. Normal bowel sounds  Ext no edema    Follow-up Appointments   Procedures    FOLLOW UP VISIT Appointment in: Two Weeks     Standing Status:   Standing     Number of Occurrences:   1     Order Specific Question:   Appointment in     Answer: Two Weeks        Total time discharging patient took greater than 30 minutes.     Signed:  Dina Roberts NP  October 13, 2017  9:18 AM

## 2017-10-13 NOTE — PROGRESS NOTES
I have reviewed discharge instructions with the patient and spouse. The patient and spouse verbalized understanding.  Both pt and spouse praised staff and verbalized appreciation for staff's care

## 2023-02-18 NOTE — PERIOP NOTES
Patient given surgical site infection FAQ handout and CHG wipes. Preop instructions reviewed and patient verbalizes understanding of instructions. Patient has been given the opportunity to ask additional questions.
No

## 2024-01-01 NOTE — ROUTINE PROCESS
0800 Bedside and Verbal shift change report given to MARKUS Daily RN (oncoming nurse) by Henri Ramsey RN (offgoing nurse). Report included the following information SBAR, Kardex, ED Summary, Procedure Summary, Intake/Output, MAR, Accordion and Recent Results. 2000 Bedside and Verbal shift change report given to Henri Ramsey RN (oncoming nurse) by Edson Foote RN (offgoing nurse). Report included the following information SBAR, Kardex, Procedure Summary, Intake/Output, MAR, Accordion and Recent Results. 

## (undated) DEVICE — PREP SKN PREVAIL 40ML APPL --

## (undated) DEVICE — DRAPE FLD WRM W44XL66IN C6L FOR INTRATEMP SYS THERMABASIN

## (undated) DEVICE — X-RAY SPONGES,16 PLY: Brand: DERMACEA

## (undated) DEVICE — SUTURE VCRL SZ 0 L18IN ABSRB VLT L36MM CT-1 1/2 CIR J740D

## (undated) DEVICE — STERILE POLYISOPRENE POWDER-FREE SURGICAL GLOVES WITH EMOLLIENT COATING: Brand: PROTEXIS

## (undated) DEVICE — CODMAN® BICOL® COLLAGEN SPONGE: Brand: CODMAN® BICOL®

## (undated) DEVICE — INFECTION CONTROL KIT SYS

## (undated) DEVICE — TOOL 9AC90 LEGEND 9CM 9MM AC: Brand: MIDAS REX

## (undated) DEVICE — SUTURE NRLN SZ 4-0 L18IN NONABSORBABLE BLK L13MM TF 1/2 CIR C584D

## (undated) DEVICE — DEVON™ KNEE AND BODY STRAP 60" X 3" (1.5 M X 7.6 CM): Brand: DEVON

## (undated) DEVICE — REM POLYHESIVE ADULT PATIENT RETURN ELECTRODE: Brand: VALLEYLAB

## (undated) DEVICE — GUN CG8900 RANEY CLIP KIT 1PK: Brand: CLIP GUN

## (undated) DEVICE — TOOL F2/8TA23 LEGEND 8CM 2.3MM TAPER: Brand: MIDAS REX™

## (undated) DEVICE — FLOSEAL MATRIX IS INDICATED IN SURGICAL PROCEDURES (OTHER THAN IN OPHTHALMIC) AS AN ADJUNCT TO HEMOSTASIS WHEN CONTROL OF BLEEDING BY LIGATURE OR CONVENTIONALPROCEDURES IS INEFFECTIVE OR IMPRACTICAL.: Brand: FLOSEAL HEMOSTATIC MATRIX

## (undated) DEVICE — CODMAN® SURGICAL PATTIES 1/2" X 3" (1.27CM X 7.62CM): Brand: CODMAN®

## (undated) DEVICE — CODMAN® SURGICAL PATTIES 1/2" X 1/2" (1.27CM X 1.27CM): Brand: CODMAN®

## (undated) DEVICE — TOOL 8TA11 LEGEND 8CM 1.1MM TA: Brand: MIDAS REX ™

## (undated) DEVICE — AGENT HEMSTAT W2XL14IN OXIDIZED REGENERATED CELOS ABSRB FOR

## (undated) DEVICE — SUTURE VCRL RAPIDE SZ 3-0 L18IN ABSRB UD PS-2 L19MM 3/8 CIR VR497

## (undated) DEVICE — SURGICAL PROCEDURE KIT CRANIOTOMY

## (undated) DEVICE — SOLUTION IV 250ML 0.9% SOD CHL CLR INJ FLX BG CONT PRT CLSR

## (undated) DEVICE — SPETZLER/BARRACUDA TIP, UNIVERSAL

## (undated) DEVICE — Device

## (undated) DEVICE — STOCKINETTE TUBE BLN 2PLY 6X72 -- MEDICHOICE CONVERT TO 363488

## (undated) DEVICE — HERMETIC™ LUMBAR CATHETER CLOSED TIP: Brand: HERMETIC™

## (undated) DEVICE — SUT SLK 2-0SH 30IN BLK --

## (undated) DEVICE — RUBBERBAND FASTENING W0.25XL3.5IN 5 PER PK

## (undated) DEVICE — MARKER SKIN XR REFLCT LF SPHR DISP

## (undated) DEVICE — SUTURE VCRL SZ 2-0 L18IN ABSRB UD L26MM CP-2 1/2 CIR REV J762D

## (undated) DEVICE — CANNULA INJ L2.5IN BLNT TIP 3MM CLR BODY W/ 1 W VLV DLP

## (undated) DEVICE — LIMITORR™ VOLUME LIMITING EXTERNAL CSF DRAINAGE AND MONITORING SYSTEM: Brand: LIMITORR™

## (undated) DEVICE — NEEDLE HYPO 22GA L1.5IN BLK S STL HUB POLYPR SHLD REG BVL

## (undated) DEVICE — SOLUTION IV 1000ML 0.9% SOD CHL

## (undated) DEVICE — DISPOSABLE TUBING SET AND EXTENDER FILTER TUBING

## (undated) DEVICE — KENDALL SCD EXPRESS SLEEVES, KNEE LENGTH, MEDIUM: Brand: KENDALL SCD

## (undated) DEVICE — 1200 GUARD II KIT W/5MM TUBE W/O VAC TUBE: Brand: GUARDIAN

## (undated) DEVICE — CLIP LIG ADH PD HORZ MED BLU --

## (undated) DEVICE — TOOL 8TD126 LEGEND 8CM 1.2MM 6MM DEPTH: Brand: MIDAS REX ™